# Patient Record
Sex: FEMALE | Race: WHITE | Employment: FULL TIME | URBAN - METROPOLITAN AREA
[De-identification: names, ages, dates, MRNs, and addresses within clinical notes are randomized per-mention and may not be internally consistent; named-entity substitution may affect disease eponyms.]

---

## 2019-08-30 ENCOUNTER — LAB ENCOUNTER (OUTPATIENT)
Dept: LAB | Facility: HOSPITAL | Age: 64
End: 2019-08-30
Attending: INTERNAL MEDICINE
Payer: COMMERCIAL

## 2019-08-30 DIAGNOSIS — Z01.818 PREOP TESTING: Primary | ICD-10-CM

## 2019-08-30 LAB
APTT PPP: 35 SECONDS (ref 23.2–35.3)
INR BLD: 1.09 (ref 0.9–1.2)
PROTHROMBIN TIME: 13.9 SECONDS (ref 11.8–14.5)

## 2019-08-30 PROCEDURE — 85610 PROTHROMBIN TIME: CPT

## 2019-08-30 PROCEDURE — 36415 COLL VENOUS BLD VENIPUNCTURE: CPT

## 2019-08-30 PROCEDURE — 85730 THROMBOPLASTIN TIME PARTIAL: CPT

## 2019-09-04 RX ORDER — ATORVASTATIN CALCIUM 20 MG/1
20 TABLET, FILM COATED ORAL DAILY
COMMUNITY

## 2019-09-06 NOTE — H&P
[address] [facility] Patient Status:  Surgery Admit Cloretta Carrel    1955 MRN [id number]   Location [place] [address] Attending Lisbet Daniel, *   Hosp Day # 0 PCP No primary care prov use: No    Drug use: Never    Allergies/Medications: Allergies:   Seasonal                    Comment:NASAL CONGESTION  Vantin [Cefpodoxime]    RASH    Comment:Reaction many years ago    No medications prior to admission.     Review of Systems:   Jacinta Melchor

## 2019-09-10 ENCOUNTER — HOSPITAL ENCOUNTER (INPATIENT)
Facility: HOSPITAL | Age: 64
LOS: 1 days | Discharge: HOME HEALTH | DRG: 470 | End: 2019-09-11
Attending: ORTHOPAEDIC SURGERY | Admitting: ORTHOPAEDIC SURGERY
Payer: COMMERCIAL

## 2019-09-10 ENCOUNTER — ANESTHESIA EVENT (OUTPATIENT)
Dept: SURGERY | Facility: HOSPITAL | Age: 64
DRG: 470 | End: 2019-09-10
Payer: COMMERCIAL

## 2019-09-10 ENCOUNTER — ANESTHESIA (OUTPATIENT)
Dept: SURGERY | Facility: HOSPITAL | Age: 64
DRG: 470 | End: 2019-09-10
Payer: COMMERCIAL

## 2019-09-10 ENCOUNTER — APPOINTMENT (OUTPATIENT)
Dept: GENERAL RADIOLOGY | Facility: HOSPITAL | Age: 64
DRG: 470 | End: 2019-09-10
Attending: PHYSICIAN ASSISTANT
Payer: COMMERCIAL

## 2019-09-10 DIAGNOSIS — M17.11 PRIMARY OSTEOARTHRITIS OF RIGHT KNEE: ICD-10-CM

## 2019-09-10 DIAGNOSIS — Z01.818 PRE-OP TESTING: Primary | ICD-10-CM

## 2019-09-10 PROBLEM — J45.909 ASTHMA (HCC): Chronic | Status: ACTIVE | Noted: 2019-09-10

## 2019-09-10 PROBLEM — J45.909 ASTHMA: Chronic | Status: ACTIVE | Noted: 2019-09-10

## 2019-09-10 LAB
ANTIBODY SCREEN: NEGATIVE
DEPRECATED RDW RBC AUTO: 44.7 FL (ref 35.1–46.3)
ERYTHROCYTE [DISTWIDTH] IN BLOOD BY AUTOMATED COUNT: 12.9 % (ref 11–15)
GLUCOSE BLDC GLUCOMTR-MCNC: 153 MG/DL (ref 70–99)
GLUCOSE BLDC GLUCOMTR-MCNC: 179 MG/DL (ref 70–99)
GLUCOSE BLDC GLUCOMTR-MCNC: 188 MG/DL (ref 70–99)
GLUCOSE BLDC GLUCOMTR-MCNC: 189 MG/DL (ref 70–99)
GLUCOSE BLDC GLUCOMTR-MCNC: 206 MG/DL (ref 70–99)
HCT VFR BLD AUTO: 38.2 % (ref 35–48)
HGB BLD-MCNC: 12.3 G/DL (ref 12–16)
MCH RBC QN AUTO: 30.4 PG (ref 26–34)
MCHC RBC AUTO-ENTMCNC: 32.2 G/DL (ref 31–37)
MCV RBC AUTO: 94.3 FL (ref 80–100)
PLATELET # BLD AUTO: 228 10(3)UL (ref 150–450)
RBC # BLD AUTO: 4.05 X10(6)UL (ref 3.8–5.3)
RH BLOOD TYPE: POSITIVE
WBC # BLD AUTO: 10.7 X10(3) UL (ref 4–11)

## 2019-09-10 PROCEDURE — 99232 SBSQ HOSP IP/OBS MODERATE 35: CPT | Performed by: HOSPITALIST

## 2019-09-10 PROCEDURE — S0077 INJECTION, CLINDAMYCIN PHOSP: HCPCS | Performed by: NURSE ANESTHETIST, CERTIFIED REGISTERED

## 2019-09-10 PROCEDURE — 0SRC0J9 REPLACEMENT OF RIGHT KNEE JOINT WITH SYNTHETIC SUBSTITUTE, CEMENTED, OPEN APPROACH: ICD-10-PCS | Performed by: ORTHOPAEDIC SURGERY

## 2019-09-10 PROCEDURE — 73560 X-RAY EXAM OF KNEE 1 OR 2: CPT | Performed by: PHYSICIAN ASSISTANT

## 2019-09-10 DEVICE — REFOBACIN BC R 1X40 US: Type: IMPLANTABLE DEVICE | Site: KNEE | Status: FUNCTIONAL

## 2019-09-10 RX ORDER — ALBUTEROL SULFATE 90 UG/1
2 AEROSOL, METERED RESPIRATORY (INHALATION) DAILY PRN
Status: DISCONTINUED | OUTPATIENT
Start: 2019-09-10 | End: 2019-09-11

## 2019-09-10 RX ORDER — FAMOTIDINE 20 MG/1
20 TABLET ORAL 2 TIMES DAILY
Status: DISCONTINUED | OUTPATIENT
Start: 2019-09-10 | End: 2019-09-10

## 2019-09-10 RX ORDER — SODIUM CHLORIDE 0.9 % (FLUSH) 0.9 %
10 SYRINGE (ML) INJECTION AS NEEDED
Status: DISCONTINUED | OUTPATIENT
Start: 2019-09-10 | End: 2019-09-11

## 2019-09-10 RX ORDER — MIDAZOLAM HYDROCHLORIDE 1 MG/ML
INJECTION INTRAMUSCULAR; INTRAVENOUS AS NEEDED
Status: DISCONTINUED | OUTPATIENT
Start: 2019-09-10 | End: 2019-09-10 | Stop reason: SURG

## 2019-09-10 RX ORDER — NALOXONE HYDROCHLORIDE 0.4 MG/ML
80 INJECTION, SOLUTION INTRAMUSCULAR; INTRAVENOUS; SUBCUTANEOUS AS NEEDED
Status: DISCONTINUED | OUTPATIENT
Start: 2019-09-10 | End: 2019-09-10 | Stop reason: HOSPADM

## 2019-09-10 RX ORDER — ONDANSETRON 2 MG/ML
INJECTION INTRAMUSCULAR; INTRAVENOUS AS NEEDED
Status: DISCONTINUED | OUTPATIENT
Start: 2019-09-10 | End: 2019-09-10 | Stop reason: SURG

## 2019-09-10 RX ORDER — MELATONIN
325
Status: DISCONTINUED | OUTPATIENT
Start: 2019-09-10 | End: 2019-09-11

## 2019-09-10 RX ORDER — HYDROMORPHONE HYDROCHLORIDE 1 MG/ML
0.4 INJECTION, SOLUTION INTRAMUSCULAR; INTRAVENOUS; SUBCUTANEOUS EVERY 5 MIN PRN
Status: DISCONTINUED | OUTPATIENT
Start: 2019-09-10 | End: 2019-09-10 | Stop reason: HOSPADM

## 2019-09-10 RX ORDER — HALOPERIDOL 5 MG/ML
0.5 INJECTION INTRAMUSCULAR ONCE AS NEEDED
Status: ACTIVE | OUTPATIENT
Start: 2019-09-10 | End: 2019-09-10

## 2019-09-10 RX ORDER — SENNOSIDES 8.6 MG
17.2 TABLET ORAL NIGHTLY
Status: DISCONTINUED | OUTPATIENT
Start: 2019-09-10 | End: 2019-09-11

## 2019-09-10 RX ORDER — ATORVASTATIN CALCIUM 20 MG/1
20 TABLET, FILM COATED ORAL DAILY
Status: DISCONTINUED | OUTPATIENT
Start: 2019-09-11 | End: 2019-09-11

## 2019-09-10 RX ORDER — ONDANSETRON 2 MG/ML
4 INJECTION INTRAMUSCULAR; INTRAVENOUS ONCE AS NEEDED
Status: DISCONTINUED | OUTPATIENT
Start: 2019-09-10 | End: 2019-09-10

## 2019-09-10 RX ORDER — BUPIVACAINE HYDROCHLORIDE AND EPINEPHRINE 5; 5 MG/ML; UG/ML
INJECTION, SOLUTION PERINEURAL AS NEEDED
Status: DISCONTINUED | OUTPATIENT
Start: 2019-09-10 | End: 2019-09-10 | Stop reason: HOSPADM

## 2019-09-10 RX ORDER — ASPIRIN 325 MG
325 TABLET ORAL 2 TIMES DAILY
Status: DISCONTINUED | OUTPATIENT
Start: 2019-09-10 | End: 2019-09-11

## 2019-09-10 RX ORDER — HYDROCODONE BITARTRATE AND ACETAMINOPHEN 7.5; 325 MG/1; MG/1
1 TABLET ORAL EVERY 6 HOURS PRN
Status: ACTIVE | OUTPATIENT
Start: 2019-09-10 | End: 2019-09-11

## 2019-09-10 RX ORDER — DEXTROSE, SODIUM CHLORIDE, AND POTASSIUM CHLORIDE 5; .45; .15 G/100ML; G/100ML; G/100ML
INJECTION INTRAVENOUS CONTINUOUS
Status: DISCONTINUED | OUTPATIENT
Start: 2019-09-10 | End: 2019-09-11

## 2019-09-10 RX ORDER — POLYETHYLENE GLYCOL 3350 17 G/17G
17 POWDER, FOR SOLUTION ORAL DAILY PRN
Status: DISCONTINUED | OUTPATIENT
Start: 2019-09-10 | End: 2019-09-11

## 2019-09-10 RX ORDER — DEXAMETHASONE SODIUM PHOSPHATE 4 MG/ML
VIAL (ML) INJECTION AS NEEDED
Status: DISCONTINUED | OUTPATIENT
Start: 2019-09-10 | End: 2019-09-10 | Stop reason: SURG

## 2019-09-10 RX ORDER — ACETAMINOPHEN 325 MG/1
650 TABLET ORAL EVERY 6 HOURS PRN
Status: ACTIVE | OUTPATIENT
Start: 2019-09-10 | End: 2019-09-11

## 2019-09-10 RX ORDER — HYDROCODONE BITARTRATE AND ACETAMINOPHEN 5; 325 MG/1; MG/1
1 TABLET ORAL AS NEEDED
Status: DISCONTINUED | OUTPATIENT
Start: 2019-09-10 | End: 2019-09-10 | Stop reason: HOSPADM

## 2019-09-10 RX ORDER — ACETAMINOPHEN 500 MG
1000 TABLET ORAL ONCE
Status: DISCONTINUED | OUTPATIENT
Start: 2019-09-10 | End: 2019-09-10 | Stop reason: HOSPADM

## 2019-09-10 RX ORDER — SODIUM PHOSPHATE, DIBASIC AND SODIUM PHOSPHATE, MONOBASIC 7; 19 G/133ML; G/133ML
1 ENEMA RECTAL ONCE AS NEEDED
Status: DISCONTINUED | OUTPATIENT
Start: 2019-09-10 | End: 2019-09-11

## 2019-09-10 RX ORDER — BUPIVACAINE HYDROCHLORIDE 7.5 MG/ML
INJECTION, SOLUTION INTRASPINAL AS NEEDED
Status: DISCONTINUED | OUTPATIENT
Start: 2019-09-10 | End: 2019-09-10 | Stop reason: SURG

## 2019-09-10 RX ORDER — HYDROMORPHONE HYDROCHLORIDE 1 MG/ML
0.2 INJECTION, SOLUTION INTRAMUSCULAR; INTRAVENOUS; SUBCUTANEOUS EVERY 5 MIN PRN
Status: DISCONTINUED | OUTPATIENT
Start: 2019-09-10 | End: 2019-09-10 | Stop reason: HOSPADM

## 2019-09-10 RX ORDER — LIDOCAINE HYDROCHLORIDE 10 MG/ML
INJECTION, SOLUTION EPIDURAL; INFILTRATION; INTRACAUDAL; PERINEURAL AS NEEDED
Status: DISCONTINUED | OUTPATIENT
Start: 2019-09-10 | End: 2019-09-10 | Stop reason: SURG

## 2019-09-10 RX ORDER — DIPHENHYDRAMINE HCL 25 MG
25 CAPSULE ORAL EVERY 4 HOURS PRN
Status: DISPENSED | OUTPATIENT
Start: 2019-09-10 | End: 2019-09-11

## 2019-09-10 RX ORDER — FAMOTIDINE 20 MG/1
20 TABLET ORAL ONCE
Status: COMPLETED | OUTPATIENT
Start: 2019-09-10 | End: 2019-09-10

## 2019-09-10 RX ORDER — BISACODYL 10 MG
10 SUPPOSITORY, RECTAL RECTAL
Status: DISCONTINUED | OUTPATIENT
Start: 2019-09-10 | End: 2019-09-11

## 2019-09-10 RX ORDER — CELECOXIB 200 MG/1
400 CAPSULE ORAL ONCE
Status: COMPLETED | OUTPATIENT
Start: 2019-09-10 | End: 2019-09-10

## 2019-09-10 RX ORDER — MORPHINE SULFATE 2 MG/ML
1 INJECTION, SOLUTION INTRAMUSCULAR; INTRAVENOUS EVERY 2 HOUR PRN
Status: DISCONTINUED | OUTPATIENT
Start: 2019-09-10 | End: 2019-09-11

## 2019-09-10 RX ORDER — NALBUPHINE HCL 10 MG/ML
2.5 AMPUL (ML) INJECTION EVERY 4 HOURS PRN
Status: ACTIVE | OUTPATIENT
Start: 2019-09-10 | End: 2019-09-11

## 2019-09-10 RX ORDER — HYDROCODONE BITARTRATE AND ACETAMINOPHEN 5; 325 MG/1; MG/1
2 TABLET ORAL AS NEEDED
Status: DISCONTINUED | OUTPATIENT
Start: 2019-09-10 | End: 2019-09-10 | Stop reason: HOSPADM

## 2019-09-10 RX ORDER — DIPHENHYDRAMINE HYDROCHLORIDE 50 MG/ML
12.5 INJECTION INTRAMUSCULAR; INTRAVENOUS EVERY 4 HOURS PRN
Status: DISCONTINUED | OUTPATIENT
Start: 2019-09-10 | End: 2019-09-11

## 2019-09-10 RX ORDER — MORPHINE SULFATE 1 MG/ML
INJECTION, SOLUTION EPIDURAL; INTRATHECAL; INTRAVENOUS AS NEEDED
Status: DISCONTINUED | OUTPATIENT
Start: 2019-09-10 | End: 2019-09-10 | Stop reason: SURG

## 2019-09-10 RX ORDER — METOCLOPRAMIDE HYDROCHLORIDE 5 MG/ML
10 INJECTION INTRAMUSCULAR; INTRAVENOUS EVERY 6 HOURS PRN
Status: DISCONTINUED | OUTPATIENT
Start: 2019-09-10 | End: 2019-09-11

## 2019-09-10 RX ORDER — MORPHINE SULFATE 4 MG/ML
4 INJECTION, SOLUTION INTRAMUSCULAR; INTRAVENOUS EVERY 2 HOUR PRN
Status: DISCONTINUED | OUTPATIENT
Start: 2019-09-10 | End: 2019-09-11

## 2019-09-10 RX ORDER — HYDROMORPHONE HYDROCHLORIDE 1 MG/ML
0.4 INJECTION, SOLUTION INTRAMUSCULAR; INTRAVENOUS; SUBCUTANEOUS
Status: ACTIVE | OUTPATIENT
Start: 2019-09-10 | End: 2019-09-11

## 2019-09-10 RX ORDER — FAMOTIDINE 10 MG/ML
20 INJECTION, SOLUTION INTRAVENOUS 2 TIMES DAILY
Status: DISCONTINUED | OUTPATIENT
Start: 2019-09-10 | End: 2019-09-10

## 2019-09-10 RX ORDER — DEXTROSE MONOHYDRATE 25 G/50ML
50 INJECTION, SOLUTION INTRAVENOUS
Status: DISCONTINUED | OUTPATIENT
Start: 2019-09-10 | End: 2019-09-10 | Stop reason: HOSPADM

## 2019-09-10 RX ORDER — DIPHENHYDRAMINE HCL 25 MG
25 CAPSULE ORAL 2 TIMES DAILY PRN
Status: DISCONTINUED | OUTPATIENT
Start: 2019-09-10 | End: 2019-09-11

## 2019-09-10 RX ORDER — ONDANSETRON 2 MG/ML
4 INJECTION INTRAMUSCULAR; INTRAVENOUS EVERY 4 HOURS PRN
Status: DISCONTINUED | OUTPATIENT
Start: 2019-09-10 | End: 2019-09-11

## 2019-09-10 RX ORDER — DIPHENHYDRAMINE HYDROCHLORIDE 50 MG/ML
25 INJECTION INTRAMUSCULAR; INTRAVENOUS ONCE AS NEEDED
Status: ACTIVE | OUTPATIENT
Start: 2019-09-10 | End: 2019-09-10

## 2019-09-10 RX ORDER — ACETAMINOPHEN 500 MG
1000 TABLET ORAL ONCE
Status: COMPLETED | OUTPATIENT
Start: 2019-09-10 | End: 2019-09-10

## 2019-09-10 RX ORDER — PROCHLORPERAZINE EDISYLATE 5 MG/ML
5 INJECTION INTRAMUSCULAR; INTRAVENOUS ONCE AS NEEDED
Status: DISCONTINUED | OUTPATIENT
Start: 2019-09-10 | End: 2019-09-10 | Stop reason: HOSPADM

## 2019-09-10 RX ORDER — PROCHLORPERAZINE EDISYLATE 5 MG/ML
10 INJECTION INTRAMUSCULAR; INTRAVENOUS EVERY 6 HOURS PRN
Status: DISCONTINUED | OUTPATIENT
Start: 2019-09-10 | End: 2019-09-11

## 2019-09-10 RX ORDER — GABAPENTIN 600 MG/1
600 TABLET ORAL ONCE
Status: COMPLETED | OUTPATIENT
Start: 2019-09-10 | End: 2019-09-10

## 2019-09-10 RX ORDER — GABAPENTIN 300 MG/1
300 CAPSULE ORAL NIGHTLY
Status: DISCONTINUED | OUTPATIENT
Start: 2019-09-10 | End: 2019-09-11

## 2019-09-10 RX ORDER — ACETAMINOPHEN 500 MG
1000 TABLET ORAL EVERY 6 HOURS PRN
COMMUNITY

## 2019-09-10 RX ORDER — ONDANSETRON 2 MG/ML
4 INJECTION INTRAMUSCULAR; INTRAVENOUS ONCE AS NEEDED
Status: DISCONTINUED | OUTPATIENT
Start: 2019-09-10 | End: 2019-09-10 | Stop reason: HOSPADM

## 2019-09-10 RX ORDER — DIPHENHYDRAMINE HCL 25 MG
25 CAPSULE ORAL EVERY 4 HOURS PRN
Status: DISCONTINUED | OUTPATIENT
Start: 2019-09-10 | End: 2019-09-11

## 2019-09-10 RX ORDER — CLINDAMYCIN PHOSPHATE 150 MG/ML
INJECTION, SOLUTION INTRAVENOUS AS NEEDED
Status: DISCONTINUED | OUTPATIENT
Start: 2019-09-10 | End: 2019-09-10 | Stop reason: SURG

## 2019-09-10 RX ORDER — CEFAZOLIN SODIUM/WATER 2 G/20 ML
2 SYRINGE (ML) INTRAVENOUS ONCE
Status: DISCONTINUED | OUTPATIENT
Start: 2019-09-10 | End: 2019-09-10 | Stop reason: ALTCHOICE

## 2019-09-10 RX ORDER — DIPHENHYDRAMINE HYDROCHLORIDE 50 MG/ML
12.5 INJECTION INTRAMUSCULAR; INTRAVENOUS EVERY 4 HOURS PRN
Status: ACTIVE | OUTPATIENT
Start: 2019-09-10 | End: 2019-09-11

## 2019-09-10 RX ORDER — SERTRALINE HYDROCHLORIDE 100 MG/1
100 TABLET, FILM COATED ORAL DAILY
Status: DISCONTINUED | OUTPATIENT
Start: 2019-09-11 | End: 2019-09-11

## 2019-09-10 RX ORDER — CELECOXIB 200 MG/1
200 CAPSULE ORAL EVERY 12 HOURS SCHEDULED
Status: DISCONTINUED | OUTPATIENT
Start: 2019-09-10 | End: 2019-09-11

## 2019-09-10 RX ORDER — LORAZEPAM 1 MG/1
1 TABLET ORAL 2 TIMES DAILY PRN
Status: DISCONTINUED | OUTPATIENT
Start: 2019-09-10 | End: 2019-09-11

## 2019-09-10 RX ORDER — MORPHINE SULFATE 2 MG/ML
2 INJECTION, SOLUTION INTRAMUSCULAR; INTRAVENOUS EVERY 2 HOUR PRN
Status: DISCONTINUED | OUTPATIENT
Start: 2019-09-10 | End: 2019-09-11

## 2019-09-10 RX ORDER — SODIUM CHLORIDE, SODIUM LACTATE, POTASSIUM CHLORIDE, CALCIUM CHLORIDE 600; 310; 30; 20 MG/100ML; MG/100ML; MG/100ML; MG/100ML
INJECTION, SOLUTION INTRAVENOUS CONTINUOUS
Status: DISCONTINUED | OUTPATIENT
Start: 2019-09-10 | End: 2019-09-10 | Stop reason: HOSPADM

## 2019-09-10 RX ORDER — DOCUSATE SODIUM 100 MG/1
100 CAPSULE, LIQUID FILLED ORAL 2 TIMES DAILY
Status: DISCONTINUED | OUTPATIENT
Start: 2019-09-10 | End: 2019-09-11

## 2019-09-10 RX ORDER — HYDROCODONE BITARTRATE AND ACETAMINOPHEN 7.5; 325 MG/1; MG/1
2 TABLET ORAL EVERY 6 HOURS PRN
Status: ACTIVE | OUTPATIENT
Start: 2019-09-10 | End: 2019-09-11

## 2019-09-10 RX ORDER — MORPHINE SULFATE 10 MG/ML
6 INJECTION, SOLUTION INTRAMUSCULAR; INTRAVENOUS EVERY 10 MIN PRN
Status: DISCONTINUED | OUTPATIENT
Start: 2019-09-10 | End: 2019-09-10 | Stop reason: HOSPADM

## 2019-09-10 RX ORDER — IBUPROFEN 600 MG/1
600 TABLET ORAL EVERY 6 HOURS PRN
Status: DISCONTINUED | OUTPATIENT
Start: 2019-09-10 | End: 2019-09-11

## 2019-09-10 RX ORDER — MORPHINE SULFATE 4 MG/ML
2 INJECTION, SOLUTION INTRAMUSCULAR; INTRAVENOUS EVERY 10 MIN PRN
Status: DISCONTINUED | OUTPATIENT
Start: 2019-09-10 | End: 2019-09-10 | Stop reason: HOSPADM

## 2019-09-10 RX ORDER — NALOXONE HYDROCHLORIDE 0.4 MG/ML
0.08 INJECTION, SOLUTION INTRAMUSCULAR; INTRAVENOUS; SUBCUTANEOUS
Status: ACTIVE | OUTPATIENT
Start: 2019-09-10 | End: 2019-09-11

## 2019-09-10 RX ORDER — ACETAMINOPHEN 325 MG/1
650 TABLET ORAL EVERY 4 HOURS PRN
Status: DISCONTINUED | OUTPATIENT
Start: 2019-09-10 | End: 2019-09-11

## 2019-09-10 RX ORDER — CYCLOBENZAPRINE HCL 10 MG
10 TABLET ORAL 3 TIMES DAILY PRN
Status: DISCONTINUED | OUTPATIENT
Start: 2019-09-10 | End: 2019-09-11

## 2019-09-10 RX ORDER — HALOPERIDOL 5 MG/ML
0.25 INJECTION INTRAMUSCULAR ONCE AS NEEDED
Status: DISCONTINUED | OUTPATIENT
Start: 2019-09-10 | End: 2019-09-10

## 2019-09-10 RX ORDER — FAMOTIDINE 20 MG/1
20 TABLET ORAL DAILY
Status: DISCONTINUED | OUTPATIENT
Start: 2019-09-11 | End: 2019-09-11

## 2019-09-10 RX ORDER — DEXTROSE MONOHYDRATE 25 G/50ML
50 INJECTION, SOLUTION INTRAVENOUS AS NEEDED
Status: DISCONTINUED | OUTPATIENT
Start: 2019-09-10 | End: 2019-09-11

## 2019-09-10 RX ORDER — METOCLOPRAMIDE 10 MG/1
10 TABLET ORAL ONCE
Status: COMPLETED | OUTPATIENT
Start: 2019-09-10 | End: 2019-09-10

## 2019-09-10 RX ORDER — HYDROCODONE BITARTRATE AND ACETAMINOPHEN 7.5; 325 MG/1; MG/1
1 TABLET ORAL EVERY 4 HOURS PRN
Status: DISCONTINUED | OUTPATIENT
Start: 2019-09-10 | End: 2019-09-11

## 2019-09-10 RX ORDER — HYDROMORPHONE HYDROCHLORIDE 1 MG/ML
0.6 INJECTION, SOLUTION INTRAMUSCULAR; INTRAVENOUS; SUBCUTANEOUS
Status: ACTIVE | OUTPATIENT
Start: 2019-09-10 | End: 2019-09-11

## 2019-09-10 RX ORDER — PROCHLORPERAZINE EDISYLATE 5 MG/ML
5 INJECTION INTRAMUSCULAR; INTRAVENOUS ONCE AS NEEDED
Status: DISCONTINUED | OUTPATIENT
Start: 2019-09-10 | End: 2019-09-10

## 2019-09-10 RX ORDER — SODIUM CHLORIDE, SODIUM LACTATE, POTASSIUM CHLORIDE, CALCIUM CHLORIDE 600; 310; 30; 20 MG/100ML; MG/100ML; MG/100ML; MG/100ML
INJECTION, SOLUTION INTRAVENOUS CONTINUOUS
Status: DISCONTINUED | OUTPATIENT
Start: 2019-09-10 | End: 2019-09-11

## 2019-09-10 RX ORDER — HYDROMORPHONE HYDROCHLORIDE 1 MG/ML
0.6 INJECTION, SOLUTION INTRAMUSCULAR; INTRAVENOUS; SUBCUTANEOUS EVERY 5 MIN PRN
Status: DISCONTINUED | OUTPATIENT
Start: 2019-09-10 | End: 2019-09-10 | Stop reason: HOSPADM

## 2019-09-10 RX ORDER — HYDROCODONE BITARTRATE AND ACETAMINOPHEN 7.5; 325 MG/1; MG/1
2 TABLET ORAL EVERY 4 HOURS PRN
Status: DISCONTINUED | OUTPATIENT
Start: 2019-09-10 | End: 2019-09-11

## 2019-09-10 RX ORDER — METOPROLOL TARTRATE 5 MG/5ML
2.5 INJECTION INTRAVENOUS ONCE
Status: DISCONTINUED | OUTPATIENT
Start: 2019-09-10 | End: 2019-09-10 | Stop reason: HOSPADM

## 2019-09-10 RX ORDER — MORPHINE SULFATE 4 MG/ML
4 INJECTION, SOLUTION INTRAMUSCULAR; INTRAVENOUS EVERY 10 MIN PRN
Status: DISCONTINUED | OUTPATIENT
Start: 2019-09-10 | End: 2019-09-10 | Stop reason: HOSPADM

## 2019-09-10 RX ADMIN — DEXAMETHASONE SODIUM PHOSPHATE 4 MG: 4 MG/ML VIAL (ML) INJECTION at 07:41:00

## 2019-09-10 RX ADMIN — LIDOCAINE HYDROCHLORIDE 5 MG: 10 INJECTION, SOLUTION EPIDURAL; INFILTRATION; INTRACAUDAL; PERINEURAL at 07:46:00

## 2019-09-10 RX ADMIN — MORPHINE SULFATE 0.25 MG: 1 INJECTION, SOLUTION EPIDURAL; INTRATHECAL; INTRAVENOUS at 07:47:00

## 2019-09-10 RX ADMIN — CLINDAMYCIN PHOSPHATE 600 MG: 150 INJECTION, SOLUTION INTRAVENOUS at 07:53:00

## 2019-09-10 RX ADMIN — MIDAZOLAM HYDROCHLORIDE 2 MG: 1 INJECTION INTRAMUSCULAR; INTRAVENOUS at 07:41:00

## 2019-09-10 RX ADMIN — ONDANSETRON 4 MG: 2 INJECTION INTRAMUSCULAR; INTRAVENOUS at 07:41:00

## 2019-09-10 RX ADMIN — LIDOCAINE HYDROCHLORIDE 5 MG: 10 INJECTION, SOLUTION EPIDURAL; INFILTRATION; INTRACAUDAL; PERINEURAL at 07:44:00

## 2019-09-10 RX ADMIN — SODIUM CHLORIDE, SODIUM LACTATE, POTASSIUM CHLORIDE, CALCIUM CHLORIDE: 600; 310; 30; 20 INJECTION, SOLUTION INTRAVENOUS at 09:29:00

## 2019-09-10 RX ADMIN — LIDOCAINE HYDROCHLORIDE 25 MG: 10 INJECTION, SOLUTION EPIDURAL; INFILTRATION; INTRACAUDAL; PERINEURAL at 07:49:00

## 2019-09-10 RX ADMIN — MIDAZOLAM HYDROCHLORIDE 2 MG: 1 INJECTION INTRAMUSCULAR; INTRAVENOUS at 07:45:00

## 2019-09-10 RX ADMIN — BUPIVACAINE HYDROCHLORIDE 1.5 ML: 7.5 INJECTION, SOLUTION INTRASPINAL at 07:47:00

## 2019-09-10 NOTE — ANESTHESIA PREPROCEDURE EVALUATION
Anesthesia PreOp Note    HPI:     Jayda Alba is a 66year old female who presents for preoperative consultation requested by: Kely Will MD    Date of Surgery: 9/10/2019    Procedure(s):  KNEE TOTAL REPLACEMENT  Indication: osteoarthritis Oral Cap Take by mouth. Disp:  Rfl:  9/9/2019 at 2200   atorvastatin 20 MG Oral Tab Take 20 mg by mouth daily. Disp:  Rfl:  9/10/2019 at 400   Cholecalciferol (VITAMIN D) 1000 UNITS Oral Tab Take 5,000 mg by mouth daily.    Disp:  Rfl:  9/9/2019 at 2000   R Once Anais Roman MD     Midazolam HCl (VERSED) 2 MG/2ML injection  Intravenous PRN Maxi GARCIA CRNA 2 mg at 09/10/19 0745    lidocaine HCl (PF) (XYLOCAINE) 1 % injection SOLN  Injection PRN Yulissa Salvador CRNA 5 mg at 09/ tablet Oral PRN Santi Leavens, DO     fentaNYL citrate (SUBLIMAZE) 0.05 MG/ML injection 25 mcg 25 mcg Intravenous Q5 Min PRN Santi Leavens, DO     fentaNYL citrate (SUBLIMAZE) 0.05 MG/ML injection 50 mcg 50 mcg Intravenous Q5 Min PRN Santi Leavens, DO (NORCO) 7.5-325 MG per tab 1 tablet 1 tablet Oral Q6H PRN Pearle Cater, DO     HYDROcodone-acetaminophen (NORCO) 7.5-325 MG per tab 2 tablet 2 tablet Oral Q6H PRN Pearle Cater, DO     HYDROmorphone HCl (DILAUDID) 1 MG/ML injection 0.4 mg 0.4 mg Intraveno file        Attends Scientologist service: Not on file        Active member of club or organization: Not on file        Attends meetings of clubs or organizations: Not on file        Relationship status: Not on file      Intimate partner violence:        Fear guardian or family member of the nature of the anesthetic plan, benefits, risks including possible dental damage if relevant, major complications, and any alternative forms of anesthetic management.    All of the patient's questions were answered to the bes

## 2019-09-10 NOTE — RESPIRATORY THERAPY NOTE
LAURA ASSESSMENT:    Pt does have a previous diagnosis of LAURA. Pt does routinely use a CPAP device at home. This pt brought their own cpap from home.

## 2019-09-10 NOTE — OPERATIVE REPORT
[facility]    PATIENT'S NAME: Delores Barbara   ATTENDING PHYSICIAN: Kendra Alejandra MD   OPERATING PHYSICIAN: Kendra Alejandra MD   PATIENT ACCOUNT#:   [id number]    LOCATION:  [facility] [address] PACU [facility] 10  MEDICAL RECORD #:   [id number] tubercle, and a medial arthrotomy was performed. There was extensive medial compartment arthritis with bone-on-bone exposure on the tibial and femoral surfaces. There was some chondromalacia of the patella itself with no exposed bone.   This was a central [address] [facility] [id number]  [facility]/

## 2019-09-10 NOTE — ANESTHESIA PROCEDURE NOTES
Spinal Block  Performed by: Frieda Mireles CRNA  Authorized by: Gris Das DO     Patient Location:  OR  Start Time:  9/10/2019 7:42 AM  End Time:  9/10/2019 7:47 AM  Site identification: surface landmarks    Reason for Block: at surgeon's re

## 2019-09-10 NOTE — INTERVAL H&P NOTE
Pre-op Diagnosis: osteoarthritis right knee    The above referenced H&P was reviewed by Devin Judge MD on 9/10/2019, the patient was examined and no significant changes have occurred in the patient's condition since the H&P was performed.   I dis

## 2019-09-10 NOTE — ANESTHESIA POSTPROCEDURE EVALUATION
Patient: Dania Vicente    Procedure Summary     Date:  09/10/19 Room / Location:  [address] [place] [address] MAIN OR 11 / [address] [place] [address] OR    Anesthesia Start:  4804 Anesthesia Stop:      Procedure:  KNEE TOTAL REPLACEMENT (Right Knee) Diagnosis:  (osteoarthritis right knee)    Michel

## 2019-09-10 NOTE — BRIEF OP NOTE
Pre-Operative Diagnosis: osteoarthritis right knee     Post-Operative Diagnosis: osteoarthritis right knee      Procedure Performed:   Procedure(s):  right unicondylar knee replacement    Surgeon(s) and Role:     * Margarito Bird MD - Primary    A

## 2019-09-10 NOTE — PHYSICAL THERAPY NOTE
PHYSICAL THERAPY KNEE EVALUATION - INPATIENT       Room Number: 421/421-A  Evaluation Date: 9/10/2019  Type of Evaluation: Initial  Physician Order: PT Eval and Treat    Presenting Problem: R unicondylar medial joint space arthroplasty  Reason for Therapy: with pain and swelling in the knee. She had a cortisone injection which did not provide any relief. \"     Problem List  Principal Problem:    Osteoarthritis of right knee  Active Problems:    Hypertension    DM type 2 (diabetes mellitus, type 2) ([facility] Cognitive Status:  WFL - within functional limits      BALANCE  Static Sitting: Fair +  Dynamic Sitting: Fair +  Static Standing: Fair -  Dynamic Standing: Fair -                                                                       ADDITIONAL TESTS Stand at assistance level: modified independent     Goal #2  Current Status    Goal #3 Patient is able to ambulate 300 feet with assistive device at assistance level: modified independent    Goal #3   Current Status    Goal #4 Patient will negotiate 3 stai

## 2019-09-10 NOTE — CM/SW NOTE
SW received MDO to discuss discharge planning with pt. Pt states she lives in a house with 1 level and 3 steps inside. Pt lives there with her  and 2 dogs.       SW discussed discharge planning options with pt who states she would like [facility]

## 2019-09-10 NOTE — PROGRESS NOTES
[facility]    Progress Note    Carola Garcia Patient Status:  Inpatient    1955 MRN [id number]   Location [facility] 4W/SW/SE Attending Edgardo Montes MD   Hosp Day # 0 PCP No primary care provider on file.         Chitra Sal 2 (diabetes mellitus, type 2) ([facility]  CONT HOME MEDS, MONITOR ACCU CHECKS. Dyslipidemia  CONT HOME MEDS. LAURA (obstructive sleep apnea)  LAURA PROTOCOL. Asthma  CONT HOME MEDS.              Results:     Lab Results   Component Value Date    WB

## 2019-09-11 VITALS
BODY MASS INDEX: 32.09 KG/M2 | WEIGHT: 174.38 LBS | DIASTOLIC BLOOD PRESSURE: 62 MMHG | HEART RATE: 63 BPM | HEIGHT: 62 IN | RESPIRATION RATE: 18 BRPM | TEMPERATURE: 98 F | SYSTOLIC BLOOD PRESSURE: 115 MMHG | OXYGEN SATURATION: 96 %

## 2019-09-11 LAB
DEPRECATED RDW RBC AUTO: 44.1 FL (ref 35.1–46.3)
ERYTHROCYTE [DISTWIDTH] IN BLOOD BY AUTOMATED COUNT: 12.7 % (ref 11–15)
EST. AVERAGE GLUCOSE BLD GHB EST-MCNC: 160 MG/DL (ref 68–126)
GLUCOSE BLDC GLUCOMTR-MCNC: 113 MG/DL (ref 70–99)
GLUCOSE BLDC GLUCOMTR-MCNC: 164 MG/DL (ref 70–99)
HBA1C MFR BLD HPLC: 7.2 % (ref ?–5.7)
HCT VFR BLD AUTO: 38 % (ref 35–48)
HGB BLD-MCNC: 12.2 G/DL (ref 12–16)
MCH RBC QN AUTO: 30.2 PG (ref 26–34)
MCHC RBC AUTO-ENTMCNC: 32.1 G/DL (ref 31–37)
MCV RBC AUTO: 94.1 FL (ref 80–100)
PLATELET # BLD AUTO: 240 10(3)UL (ref 150–450)
RBC # BLD AUTO: 4.04 X10(6)UL (ref 3.8–5.3)
WBC # BLD AUTO: 12 X10(3) UL (ref 4–11)

## 2019-09-11 PROCEDURE — 99239 HOSP IP/OBS DSCHRG MGMT >30: CPT | Performed by: HOSPITALIST

## 2019-09-11 RX ORDER — GABAPENTIN 300 MG/1
300 CAPSULE ORAL NIGHTLY
Qty: 20 CAPSULE | Refills: 0 | Status: SHIPPED | OUTPATIENT
Start: 2019-09-11

## 2019-09-11 RX ORDER — ASPIRIN 325 MG
325 TABLET ORAL 2 TIMES DAILY
Qty: 60 TABLET | Refills: 0 | Status: SHIPPED | OUTPATIENT
Start: 2019-09-11

## 2019-09-11 RX ORDER — IBUPROFEN 600 MG/1
600 TABLET ORAL EVERY 6 HOURS PRN
Qty: 50 TABLET | Refills: 0 | Status: SHIPPED | OUTPATIENT
Start: 2019-09-11

## 2019-09-11 RX ORDER — CYCLOBENZAPRINE HCL 10 MG
10 TABLET ORAL 3 TIMES DAILY PRN
Qty: 30 TABLET | Refills: 0 | Status: SHIPPED | OUTPATIENT
Start: 2019-09-11

## 2019-09-11 RX ORDER — PSEUDOEPHEDRINE HCL 30 MG
100 TABLET ORAL 2 TIMES DAILY
Qty: 20 CAPSULE | Refills: 0 | Status: SHIPPED | OUTPATIENT
Start: 2019-09-11

## 2019-09-11 RX ORDER — CELECOXIB 200 MG/1
200 CAPSULE ORAL EVERY 12 HOURS SCHEDULED
Qty: 60 CAPSULE | Refills: 0 | Status: SHIPPED | OUTPATIENT
Start: 2019-09-11

## 2019-09-11 NOTE — DISCHARGE SUMMARY
[facility]  Discharge Summary     Vipul Colby  : 1955    Status: Inpatient  Day #: 1    Attending: Kaiarbrown Pelaez MD  PCP: No primary care provider on file.      Date of Admission: 9/10/2019  Date of Discharge: 2019     MECHELLE BURKETT capsule  Refills:  0     Cyclobenzaprine HCl 10 MG Tabs  Commonly known as:  cyclobenzaprine      Take 1 tablet (10 mg total) by mouth 3 (three) times daily as needed for Muscle spasms.    Quantity:  30 tablet  Refills:  0     docusate sodium 100 MG Caps  C GLUCOPHAGE      Take 500 mg by mouth 2 (two) times daily with meals. Refills:  0     Metoprolol Succinate ER 50 MG Tb24  Commonly known as: Toprol XL      Take 75 mg by mouth daily. Refills:  0     multivitamin Tabs      Take 1 tablet by mouth daily.

## 2019-09-11 NOTE — PROGRESS NOTES
[address] [place] [address] Patient Status:  Inpatient    1955 MRN [id number]   Location [facility] 4W/SW/SE Attending Angela Grady MD   Hosp Day # 1 PCP No primary care provider on file.      Subjective:  Post-Operative Day: 1 Status Po

## 2019-09-11 NOTE — PLAN OF CARE
Pain managed with PRN tylenol and ibuprofen Voiding freely. Up 1/walker. Plan [facility] Patient has remained free from falls throughout stay. Hourly rounding maintained. Pt's bed in lowest position w/ side rails up.  Patient has been educat Discharge     Problem: SAFETY ADULT - FALL  Goal: Free from fall injury  Description  INTERVENTIONS:  - Assess pt frequently for physical needs  - Identify cognitive and physical deficits and behaviors that affect risk of falls.   - [place] fall precautio Instruct patient on self management of diabetes  Outcome: Adequate for Discharge

## 2019-09-11 NOTE — OCCUPATIONAL THERAPY NOTE
OCCUPATIONAL THERAPY EVALUATION - INPATIENT     Room Number: 421/421-A  Evaluation Date: 9/11/2019  Type of Evaluation: Initial       Physician Order: IP Consult to Occupational Therapy  Reason for Therapy: ADL/IADL Dysfunction and Discharge Planning Medical History  Past Medical History:   Diagnosis Date   • Anxiety state     metoprolol,5:30   • Arrhythmia     HAD TACHYCARDIA DUE TO ANXIETY ATTACKS, THIS IS REASON FOR METOPROLOL   • Asthma     ALLERGY INDUCED ASTHMA,ALSO PANICK ATTACTS   • Calculus of STRENGTH ASSESSMENT  Upper extremity strength is within functional limits    COORDINATION  Gross Motor: WFL   Fine Motor: WFL     ADDITIONAL TESTS                                    NEUROLOGICAL FINDINGS                   ACTIVITIES OF DAILY LIVING ASS

## 2019-09-11 NOTE — ANESTHESIA POST-OP FOLLOW-UP NOTE
Salty Flowers is POD#1 after   Surgical Procedures     Case IDs Date Procedure Surgeon Location Status    [id number] 9/10/19 KNEE TOTAL REPLACEMENT Zuly Bird MD [facility] OR [facility]      Chacho Salty Flowers underwent spinal anesthesia for analgesia.  Leatha Hernandes

## 2019-09-11 NOTE — PHYSICAL THERAPY NOTE
PHYSICAL THERAPY KNEE TREATMENT NOTE - INPATIENT     Room Number: 421/421-A             Presenting Problem: R unicondylar medial joint space arthroplasty    Problem List  Principal Problem:    Osteoarthritis of right knee  Active Problems:    Hypertension +  Dynamic Standing: Fair +    ACTIVITY TOLERANCE                         O2 WALK                  AM-PAC '6-Clicks' INPATIENT SHORT FORM - BASIC MOBILITY  How much difficulty does the patient currently have. ..  -   Turning over in bed (including adjusting Current Status Negotiated 4 stairs with 1 HR CGA   Goal #5  AROM 0 degrees extension to 95 degrees flexion     Goal #5   Current Status IN PROGRESS   Goal #6 Patient independently performs home exercise program for ROM/strengthening per the instructions

## 2019-09-11 NOTE — PLAN OF CARE
Problem: Patient Centered Care  Goal: Patient preferences are identified and integrated in the patient's plan of care  Description  Interventions:  - What would you like us to know as we care for you?   - Provide timely, complete, and accurate informatio Progressing     Problem: DISCHARGE PLANNING  Goal: Discharge to home or other facility with appropriate resources  Description  INTERVENTIONS:  - Identify barriers to discharge w/pt and caregiver  - Include patient/family/discharge partner in discharge diamond

## 2019-09-11 NOTE — CM/SW NOTE
SW received call RN stating that pt is ready for discharge. MARTY saw [facility] orders were entered and sent order via [employer] to [employer]. Plan: pt will be discharging home with Delfino Lomas through [facility].      SW/CM to remain available for support and

## 2019-12-12 ENCOUNTER — TELEPHONE (OUTPATIENT)
Dept: ORTHOPEDICS CLINIC | Facility: CLINIC | Age: 64
End: 2019-12-12

## 2019-12-12 NOTE — TELEPHONE ENCOUNTER
Dr Ivonne Foy and Jose Nicholson -     Is this an OS patient? Sx 9/10/19, no appts before or since at [address].     Please advise on refill

## 2019-12-12 NOTE — TELEPHONE ENCOUNTER
Current Outpatient Medications   Medication Sig Dispense Refill   • Cyclobenzaprine HCl 10 MG Oral Tab Take 1 tablet (10 mg total) by mouth 3 (three) times daily as needed for Muscle spasms.  30 tablet 0

## 2019-12-12 NOTE — TELEPHONE ENCOUNTER
S/w Jojo at [facility]. She confirmed that pt is an OS pt. I relayed the refill request and Nghia Alvarez' msg re: pt and refill request. She stated she will call the pt and follow up.     Dr Bird/Jozef Nunes

## 2023-08-24 LAB
AMB EXT ANION GAP: 10
AMB EXT BILIRUBIN, TOTAL: 0.4 MG/DL
AMB EXT BUN: 25 MG/DL
AMB EXT CALCIUM: 10.7
AMB EXT CARBON DIOXIDE: 25
AMB EXT CHLORIDE: 105
AMB EXT CHOLESTEROL, TOTAL: 181 MG/DL
AMB EXT CMP ALT: 14 U/L
AMB EXT CMP AST: 17 U/L
AMB EXT CREATININE: 0.84 MG/DL
AMB EXT EGFR NON-AA: 77
AMB EXT GLUCOSE: 129 MG/DL
AMB EXT HDL CHOLESTEROL: 38 MG/DL
AMB EXT HEMATOCRIT: 46.3
AMB EXT HEMOGLOBIN: 14.7
AMB EXT HGBA1C: 6.9 %
AMB EXT MCV: 91.3
AMB EXT PLATELETS: 265
AMB EXT POSTASSIUM: 4.4 MMOL/L
AMB EXT SODIUM: 140 MMOL/L
AMB EXT TOTAL PROTEIN: 7.4
AMB EXT TRIGLYCERIDES: 463 MG/DL
AMB EXT WBC: 8.4 X10(3)UL

## 2023-11-13 ENCOUNTER — TELEPHONE (OUTPATIENT)
Dept: FAMILY MEDICINE CLINIC | Facility: CLINIC | Age: 68
End: 2023-11-13

## 2024-01-19 ENCOUNTER — OFFICE VISIT (OUTPATIENT)
Dept: OBGYN CLINIC | Facility: CLINIC | Age: 69
End: 2024-01-19
Payer: MEDICARE

## 2024-01-19 VITALS
SYSTOLIC BLOOD PRESSURE: 124 MMHG | DIASTOLIC BLOOD PRESSURE: 70 MMHG | HEART RATE: 82 BPM | BODY MASS INDEX: 28.78 KG/M2 | WEIGHT: 156.38 LBS | HEIGHT: 62 IN

## 2024-01-19 DIAGNOSIS — Z01.419 ENCOUNTER FOR WELL WOMAN EXAM WITH ROUTINE GYNECOLOGICAL EXAM: Primary | ICD-10-CM

## 2024-01-19 DIAGNOSIS — L73.2 HIDRADENITIS: ICD-10-CM

## 2024-01-19 PROCEDURE — G0101 CA SCREEN;PELVIC/BREAST EXAM: HCPCS | Performed by: OBSTETRICS & GYNECOLOGY

## 2024-01-19 RX ORDER — MELATONIN
1000 DAILY
COMMUNITY

## 2024-01-19 RX ORDER — FLUTICASONE PROPIONATE AND SALMETEROL 250; 50 UG/1; UG/1
1 POWDER RESPIRATORY (INHALATION) EVERY 12 HOURS
COMMUNITY
Start: 2024-01-02

## 2024-01-19 RX ORDER — CLINDAMYCIN PHOSPHATE 10 MG/G
1 GEL TOPICAL 2 TIMES DAILY
Qty: 30 G | Refills: 0 | Status: SHIPPED | OUTPATIENT
Start: 2024-01-19

## 2024-01-19 NOTE — PROGRESS NOTES
[facility]  Obstetrics and Gynecology    Subjective:     Jewels Reynolds is a 68 year old  who presents for an annual gyn exam. Patient complaints include boils in her groin, has tried changing underwear, not shaving, no difference.    No LMP recorded (lmp unknown). Patient is postmenopausal.   Menarche: 14 (2024 12:51 PM)  Period Cycle (Days): menopause (2024 12:51 PM)  Period Duration (Days): n/a (2024 12:51 PM)  Period Flow: n/a (2024 12:51 PM)  Use of Birth Control (if yes, specify type): Postmenopausal (2024 12:51 PM)  Hx Prior Abnormal Pap: No (2024 12:51 PM)  Pap Result Notes: last pap  wnl per patient (2024 12:51 PM)     Dyspareunia: N/A.    Difficulty with bowel or bladder function: No.   History of STDs: No  Smoker: No.  Safe at home: Yes.  Retired, takes care of 3yo grandson, expecting another grandchild this year!    Screening:  Pap smear: neg   Mammogram: - , scheduled   Colonoscopy: 2014   DEXA: ordered       Review of Systems  Constitutional: Denies fever/chills, weight loss, fatigue, weakness, or sweating  HEENT: Denies headache, hearing loss or tinnitus, ear pain or discharge, nosebleeds, congestion, sore throat  Eye: Denies visual changes, eye pain or discharge or redness  Cardiovascular: Denies chest pain, palpitations  Pulmonary: Denies cough, shortness of breath, wheezing  Breast: denies breast pain or palpable mass, skin changes, nipple discharge  GI: Denies nausea, vomiting, diarrhea, constipation, heartburn, hemachezia  : Denies dysuria, urgency, frequency, hematuria, flank pain  Musculoskeletal: Denies myalgias, pain in neck or back or joints  Skin: Denies rash, itching  Endocrine: Denies easy bruising or bleeding, increased thirst  Neuro: Denies dizziness, paraesthesias, focal weakness, seizures, loss of consciousness  Psych: Denies depression, anxiety, suicidal ideations, hallucinations, insomnia     Past Medical  History   Past Medical History:   Diagnosis Date    Allergic rhinitis     Anxiety     Anxiety state     metoprolol,5:30    Arrhythmia     HAD TACHYCARDIA DUE TO ANXIETY ATTACKS, THIS IS REASON FOR METOPROLOL    Arthritis     Asthma     ALLERGY INDUCED ASTHMA,ALSO PANICK ATTACTS    Calculus of kidney     Coronary atherosclerosis     negative XT '14    Depression     Diabetes (HCC)     Esophageal reflux     indigestion    High blood pressure     High cholesterol     Hyperlipidemia     IBS (irritable bowel syndrome)     Nasal congestion 10/08/2016    STATES DUE TO ENVIRONMENTAL ALLERGIES    Osteoarthritis     Sleep apnea     cpap    Visual impairment     GLASSES AND CONTACTS         Past Obstetric History   OB History    Para Term  AB Living   2 2 2     2   SAB IAB Ectopic Multiple Live Births           2      # Outcome Date GA Lbr Eros/2nd Weight Sex Delivery Anes PTL Lv   2 Term 89 37w0d  6 lb 4 oz (2.835 kg) F NORMAL SPONT   KATHY   1 Term 88 37w0d  5 lb 10 oz (2.551 kg) M NORMAL SPONT   KATHY       Past Surgical History   Past Surgical History:   Procedure Laterality Date    APPENDECTOMY      BENIGN BIOPSY RIGHT      COLONOSCOPY      COLONOSCOPY      KNEE REPLACEMENT SURGERY Right 09/10/2019    PARTIAL           TONSILLECTOMY      TOTAL KNEE REPLACEMENT Left 2017    TOTAL KNEE REPLACEMENT Right 09/10/2019        Family History   Family History   Problem Relation Age of Onset    Other (Other) Father         DEMENTIA    Dementia Father     Depression Father     Heart Disorder Father         Stents    Psychiatric Father         Paranoid    Stroke Father         TIA’S    Cancer Mother         OVARIAN    Heart Disorder Mother         Murmur    Other (Other) Sister         RHEUMATOID        Social History   Social History     Socioeconomic History    Marital status:    Tobacco Use    Smoking status: Never     Passive exposure: Yes    Smokeless tobacco: Never    Tobacco comments:     Less  than 10 per day stupidly started at 18   Vaping Use    Vaping Use: Never used   Substance and Sexual Activity    Alcohol use: No    Drug use: Never    Sexual activity: Not Currently     Partners: Male   Other Topics Concern    Caffeine Concern Yes    Exercise No    Seat Belt Yes    Special Diet No    Stress Concern Yes    Weight Concern No        Medications   Current Outpatient Medications on File Prior to Visit   Medication Sig Dispense Refill    fluticasone-salmeterol 250-50 MCG/ACT Inhalation Aerosol Powder, Breath Activated Inhale 1 puff into the lungs Q12H.      cyanocobalamin 1000 MCG Oral Tab Take 1 tablet (1,000 mcg total) by mouth daily.      JARDIANCE 10 MG Oral Tab Take 1 tablet (10 mg total) by mouth daily.      Multiple Vitamins-Minerals (WOMENS DAILY FORM/FA/CA/FE) Oral Tab Take by mouth.      pyridoxine 100 MG Oral Tab Take 1 tablet (100 mg total) by mouth daily.      B Complex-Folic Acid (SUPER B COMPLEX MAXI OR) Take by mouth.      Omega-3 Fatty Acids (FISH OIL TRIPLE STRENGTH OR) Take by mouth.      sertraline 100 MG Oral Tab Take 2 tablets (200 mg total) by mouth daily. 180 tablet 0    LORazepam 1 MG Oral Tab Take 1 tablet (1 mg total) by mouth 2 (two) times daily. 60 tablet 2    acetaminophen 500 MG Oral Tab Take 2 tablets (1,000 mg total) by mouth every 6 (six) hours as needed for Pain.      atorvastatin 20 MG Oral Tab Take 1 tablet (20 mg total) by mouth daily.      Cholecalciferol (VITAMIN D) 1000 UNITS Oral Tab Take 5,000 mg by mouth daily.        Albuterol Sulfate HFA (PROAIR HFA) 108 (90 BASE) MCG/ACT Inhalation Aero Soln Inhale 2 puffs into the lungs daily as needed for Wheezing.      Fluticasone Propionate  MCG/ACT Inhalation Aerosol Inhale 1 puff into the lungs 2 (two) times daily.      fenofibrate micronized 134 MG Oral Cap Take 155 mg by mouth nightly.        MetFORMIN HCl 500 MG Oral Tab Take 1 tablet (500 mg total) by mouth 2 (two) times daily with meals.      Metoprolol  Succinate ER 50 MG Oral Tablet 24 Hr Take 1.5 tablets (75 mg total) by mouth daily.       No current facility-administered medications on file prior to visit.       Allergies   Allergies   Allergen Reactions    Clarithromycin DIARRHEA and NAUSEA ONLY     Per pt \" diarrhea and abdominal pain.\"    Dust Mite Extract Runny nose    Seasonal      NASAL CONGESTION    Vantin [Cefpodoxime] RASH     Reaction many years ago          Objective:     Vitals:    24 1246   BP: 124/70   Pulse: 82   Weight: 156 lb 6 oz (70.9 kg)   Height: 62\"       Body mass index is 28.6 kg/m².    GEN: AAOx3, NAD, appears well, appears stated age  HEENT: normocephalic, atraumatic, thyroid symmetric without enlargement or nodularity  BREAST: bilaterally symmetric with no palpable masses, no nipple discharge, no skin changes  CV: RRR  PULM: CTAB  ABD: soft, NT, ND, no rebound or guarding  EXT: no c/c/e or tenderness  NEURO: CN 2-12 grossly intact  PELVIC:   Vulva: NEFG. Hidradenitis lesions and scars around groin/mons.   Vagina: Hypoestrogenized, physiologic discharge.    Cervix: No lesions or tenderness.     Uterus: anteverted, 4 week size, firm, mobile, nontender.     Adnexa: No masses or tenderness.     Rectal: Anus and perineum are normal.      Assessment:     Jewels Reynolds is a 68 year old  seen for well-women gyn exam.    Plan:     -- rx topical clindamycin for hidradenitis  -- cervical cancer screening: up to date with pap smear   -- breast cancer screening: continue annual CBE and mammograms  -- STD screening ordered: No  -- Contraception: n/a  -- Discussed further preventative care with PCP, staying up to date with screening and vaccinations, and maintaining healthy diet and exercise.      -- Follow up in 1 year for annual exam or sooner as needed    Savanna Richard MD  [facility]  2024 12:59 PM

## 2024-02-01 ENCOUNTER — HOSPITAL ENCOUNTER (OUTPATIENT)
Dept: MAMMOGRAPHY | Age: 69
Discharge: HOME | End: 2024-02-01
Attending: FAMILY MEDICINE
Payer: MEDICARE

## 2024-02-01 DIAGNOSIS — Z12.31 BREAST CANCER SCREENING BY MAMMOGRAM: ICD-10-CM

## 2024-02-01 PROCEDURE — 77067 SCR MAMMO BI INCL CAD: CPT | Performed by: FAMILY MEDICINE

## 2024-02-01 PROCEDURE — 77063 BREAST TOMOSYNTHESIS BI: CPT | Performed by: FAMILY MEDICINE

## 2024-03-23 DIAGNOSIS — L73.2 HIDRADENITIS: ICD-10-CM

## 2024-03-25 RX ORDER — CLINDAMYCIN PHOSPHATE 10 MG/G
1 GEL TOPICAL 2 TIMES DAILY
Qty: 30 G | Refills: 0 | OUTPATIENT
Start: 2024-03-25

## 2024-03-25 NOTE — TELEPHONE ENCOUNTER
Last OV: 1/19/24 KARIN Quintana  Last Refill Date: 1/19/24 30 grams 0 refills  Follow Up:  1 year 1/2025  Next Appt. None noted on chart.    Dangelo

## 2024-07-11 ENCOUNTER — TELEPHONE (OUTPATIENT)
Dept: FAMILY MEDICINE CLINIC | Facility: CLINIC | Age: 69
End: 2024-07-11

## 2024-07-11 NOTE — TELEPHONE ENCOUNTER
Spoke to the patient, she said she completed her Medicare physical elsewhere, I asked if she changed her PCP and she said, \"not necessarily\" and ended the call.

## 2024-09-22 ENCOUNTER — HOSPITAL ENCOUNTER (EMERGENCY)
Age: 69
Discharge: HOME | End: 2024-09-22
Attending: EMERGENCY MEDICINE
Payer: MEDICARE

## 2024-09-22 VITALS
BODY MASS INDEX: 28.52 KG/M2 | TEMPERATURE: 97 F | WEIGHT: 155 LBS | SYSTOLIC BLOOD PRESSURE: 123 MMHG | RESPIRATION RATE: 18 BRPM | HEIGHT: 62 IN | HEART RATE: 83 BPM | DIASTOLIC BLOOD PRESSURE: 76 MMHG | OXYGEN SATURATION: 96 %

## 2024-09-22 DIAGNOSIS — S61.451A DOG BITE, HAND, RIGHT, INITIAL ENCOUNTER: Primary | ICD-10-CM
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
## 2024-09-22 DIAGNOSIS — S61.411A LACERATION OF RIGHT HAND WITHOUT FOREIGN BODY, INITIAL ENCOUNTER: ICD-10-CM

## 2024-09-22 PROCEDURE — 99283 EMERGENCY DEPT VISIT LOW MDM: CPT

## 2024-09-22 RX ORDER — LORAZEPAM 1 MG/1
1 TABLET ORAL 2 TIMES DAILY
COMMUNITY
Start: 2024-08-16

## 2024-09-22 RX ORDER — SERTRALINE HYDROCHLORIDE 100 MG/1
200 TABLET, FILM COATED ORAL DAILY
COMMUNITY

## 2024-09-22 NOTE — DISCHARGE INSTRUCTIONS
Take 1000 mg acetaminophen (2 Tylenol tablets) and/or 600 mg ibuprofen (3 Advil tablets) every 6 hours as needed    Return if any signs of infection.      Apply ice to reduce the swelling

## 2024-09-22 NOTE — ED PROVIDER NOTES
Patient Seen in: [facility] In [place]      History     Chief Complaint   Patient presents with    Bite     Stated Complaint: dogbite to righ hand/wrist    Subjective:     HPI    69-year-old woman bitten by her own dog today over the right hand and wrist.  She has a hematoma and there is multiple small, half centimeter, lacerations.  This happened earlier today. Her  cleansed the wounds them with peroxide at home.  Last tetanus immunization was 1 year ago.      Objective:   Past Medical History:    Allergic rhinitis    Anxiety    Anxiety state    metoprolol,5:30    Arrhythmia    HAD TACHYCARDIA DUE TO ANXIETY ATTACKS, THIS IS REASON FOR METOPROLOL    Arthritis    Asthma (HCC)    ALLERGY INDUCED ASTHMA,ALSO PANICK ATTACTS    Calculus of kidney    Coronary atherosclerosis    negative XT '14    Depression    Diabetes (HCC)    Esophageal reflux    indigestion    High blood pressure    High cholesterol    Hyperlipidemia    IBS (irritable bowel syndrome)    Nasal congestion    STATES DUE TO ENVIRONMENTAL ALLERGIES    Osteoarthritis    Sleep apnea    cpap    Visual impairment    GLASSES AND CONTACTS              Past Surgical History:   Procedure Laterality Date    Appendectomy      Benign biopsy right      Colonoscopy      Colonoscopy      Knee replacement surgery Right 09/10/2019    PARTIAL           Tonsillectomy      Total knee replacement Left 2017    Total knee replacement Right 09/10/2019                Social History     Socioeconomic History    Marital status:    Tobacco Use    Smoking status: Every Day     Types: Cigarettes     Passive exposure: Yes    Smokeless tobacco: Never    Tobacco comments:     Less than 10 per day stupidly started at 18   Vaping Use    Vaping status: Never Used   Substance and Sexual Activity    Alcohol use: No    Drug use: Never    Sexual activity: Not Currently     Partners: Male   Other Topics Concern    Caffeine Concern Yes    Exercise No     Seat Belt Yes    Special Diet No    Stress Concern Yes    Weight Concern No     Social Determinants of Health     Financial Resource Strain: Low Risk  (1/26/2024)    [facility]    Overall Financial Resource Strain (CARDIA)     Difficulty of Paying Living Expenses: Not very hard   Food Insecurity: No Food Insecurity (1/26/2024)    [facility]    Hunger Vital Sign     Worried About Running Out of Food in the Last Year: Never true     Ran Out of Food in the Last Year: Never true   Transportation Needs: No Transportation Needs (1/26/2024)    [facility]    PRAPARE - Transportation     Lack of Transportation (Medical): No     Lack of Transportation (Non-Medical): No   Housing Stability: Unknown (1/26/2024)    [facility]    Housing Stability Vital Sign     Unable to Pay for Housing in the Last Year: No     Unstable Housing in the Last Year: No              Review of Systems    Positive for stated complaint: dogbite to righ hand/wrist  Other systems are as noted in HPI.  Constitutional and vital signs reviewed.      All other systems reviewed and negative except as noted above.    Physical Exam     ED Triage Vitals [09/22/24 1602]   /76   Pulse 83   Resp 18   Temp 97.4 °F (36.3 °C)   Temp src Temporal   SpO2 96 %   O2 Device None (Room air)       Current:/76   Pulse 83   Temp 97.4 °F (36.3 °C) (Temporal)   Resp 18   Ht 157.5 cm (5' 2\")   Wt 70.3 kg   LMP  (LMP Unknown)   SpO2 96%   BMI 28.35 kg/m²       General:  Vitals as listed.  No acute distress   Extremities: no edema, normal peripheral pulses.  Multiple small lacerations about the left dorsal hand and ventral wrist with deangelo ecchymosis.  Normal perfusion and sensation of the  right hand.              ED COURSE and MDM       I reviewed prior external notes including immunization history    A sepsis performed    The small wounds where there was some skin separation were reapproximated with Steri-Strips    Patient prescribed Augmentin to prevent infection.  She is aware that infection can still occur and she will return if there is any concerns    I have discussed with the patient the results of testing, differential diagnosis, and treatment plan. They expressed clear understanding of these instructions and agrees to the plan provided.    Disposition and Plan     Clinical Impression:  1. Dog bite, hand, right, initial encounter    2. Laceration of right hand without foreign body, initial encounter         Disposition:  Discharge  9/22/2024  5:04 pm    Follow-up:  Wilmer Salinas,   [address] [id number] [phone]    Follow up in 3 day(s)  For wound re-check, As needed        Medications Prescribed:  Current Discharge Medication List        START taking these medications    Details   amoxicillin clavulanate 875-125 MG Oral Tab Take 1 tablet by mouth 2 (two) times daily for 5 days.  Qty: 10 tablet, Refills: 0

## 2024-09-22 NOTE — ED INITIAL ASSESSMENT (HPI)
States her dog bit her on her right hand and wrist a few ago. Small wounds  noted with no active bleeding

## 2024-12-16 ENCOUNTER — OFFICE VISIT (OUTPATIENT)
Dept: FAMILY MEDICINE CLINIC | Facility: CLINIC | Age: 69
End: 2024-12-16
Payer: MEDICARE

## 2024-12-16 VITALS
SYSTOLIC BLOOD PRESSURE: 132 MMHG | OXYGEN SATURATION: 95 % | HEART RATE: 79 BPM | DIASTOLIC BLOOD PRESSURE: 80 MMHG | TEMPERATURE: 98 F | RESPIRATION RATE: 18 BRPM

## 2024-12-16 DIAGNOSIS — R39.9 UTI SYMPTOMS: Primary | ICD-10-CM

## 2024-12-16 LAB
APPEARANCE: CLEAR
BILIRUBIN: NEGATIVE
GLUCOSE (URINE DIPSTICK): 100 MG/DL
KETONES (URINE DIPSTICK): NEGATIVE MG/DL
MULTISTIX EXPIRATION DATE: ABNORMAL DATE
MULTISTIX LOT#: ABNORMAL NUMERIC
NITRITE, URINE: NEGATIVE
PH, URINE: 6 (ref 4.5–8)
PROTEIN (URINE DIPSTICK): NEGATIVE MG/DL
SPECIFIC GRAVITY: 1.01 (ref 1–1.03)
URINE-COLOR: YELLOW
UROBILINOGEN,SEMI-QN: 0.2 MG/DL (ref 0–1.9)

## 2024-12-16 PROCEDURE — 87086 URINE CULTURE/COLONY COUNT: CPT | Performed by: NURSE PRACTITIONER

## 2024-12-16 PROCEDURE — 87088 URINE BACTERIA CULTURE: CPT | Performed by: NURSE PRACTITIONER

## 2024-12-16 PROCEDURE — 87186 SC STD MICRODIL/AGAR DIL: CPT | Performed by: NURSE PRACTITIONER

## 2024-12-16 NOTE — PROGRESS NOTES
CHIEF COMPLAINT:     Chief Complaint   Patient presents with    UTI     Pain while urinating and blood in urine on Saturday, but has the pain or seen blood since staturday       HPI:   Jewels Reynolds is a 69 year old female who presents with symptoms of UTI. Complaining of urinary urgency, blood in urine and bladder pressure on Saturday.  Reports then drank 5 glasses of water and symptoms improved.  Reports unsure if she passed a kidney stone or if infection.  Reports has hx of both in past.  Symptoms have been improved since onset.  Treatments tried: none.  Associated symptoms: none.   Denies any flank pain, fever, further hematuria, nausea, or vomiting.  Denies vaginal complaints. Reports voiding well with strong stream.     Current Outpatient Medications   Medication Sig Dispense Refill    LORazepam 1 MG Oral Tab Take 1 tablet (1 mg total) by mouth 2 (two) times daily.      sertraline 100 MG Oral Tab Take 2 tablets (200 mg total) by mouth daily.      cyanocobalamin 1000 MCG Oral Tab Take 1 tablet (1,000 mcg total) by mouth daily.      fluticasone-salmeterol 250-50 MCG/ACT Inhalation Aerosol Powder, Breath Activated Inhale 1 puff into the lungs Q12H.      Clindamycin Phosphate 1 % External Gel Apply 1 Application topically 2 (two) times daily. 30 g 0    JARDIANCE 10 MG Oral Tab Take 1 tablet (10 mg total) by mouth daily.      Multiple Vitamins-Minerals (WOMENS DAILY FORM/FA/CA/FE) Oral Tab Take by mouth.      pyridoxine 100 MG Oral Tab Take 1 tablet (100 mg total) by mouth daily.      B Complex-Folic Acid (SUPER B COMPLEX MAXI OR) Take by mouth.      Omega-3 Fatty Acids (FISH OIL TRIPLE STRENGTH OR) Take by mouth.      acetaminophen 500 MG Oral Tab Take 2 tablets (1,000 mg total) by mouth every 6 (six) hours as needed for Pain.      atorvastatin 20 MG Oral Tab Take 1 tablet (20 mg total) by mouth daily.      Cholecalciferol (VITAMIN D) 1000 UNITS Oral Tab Take 5,000 mg by mouth daily.        Albuterol Sulfate  HFA (PROAIR HFA) 108 (90 BASE) MCG/ACT Inhalation Aero Soln Inhale 2 puffs into the lungs daily as needed for Wheezing.      Fluticasone Propionate  MCG/ACT Inhalation Aerosol Inhale 1 puff into the lungs 2 (two) times daily.      fenofibrate micronized 134 MG Oral Cap Take 155 mg by mouth nightly.        MetFORMIN HCl 500 MG Oral Tab Take 1 tablet (500 mg total) by mouth 2 (two) times daily with meals.      Metoprolol Succinate ER 50 MG Oral Tablet 24 Hr Take 1.5 tablets (75 mg total) by mouth daily.        Past Medical History:    Allergic rhinitis    Anxiety    Anxiety state    metoprolol,5:30    Arrhythmia    HAD TACHYCARDIA DUE TO ANXIETY ATTACKS, THIS IS REASON FOR METOPROLOL    Arthritis    Asthma (HCC)    ALLERGY INDUCED ASTHMA,ALSO PANICK ATTACTS    Calculus of kidney    Coronary atherosclerosis    negative XT '14    Depression    Diabetes (HCC)    Esophageal reflux    indigestion    High blood pressure    High cholesterol    Hyperlipidemia    IBS (irritable bowel syndrome)    Nasal congestion    STATES DUE TO ENVIRONMENTAL ALLERGIES    Osteoarthritis    Sleep apnea    cpap    Visual impairment    GLASSES AND CONTACTS      Social History:  Social History     Socioeconomic History    Marital status:    Tobacco Use    Smoking status: Every Day     Types: Cigarettes     Passive exposure: Yes    Smokeless tobacco: Never    Tobacco comments:     Less than 10 per day stupidly started at 18   Vaping Use    Vaping status: Never Used   Substance and Sexual Activity    Alcohol use: No    Drug use: Never    Sexual activity: Not Currently     Partners: Male   Other Topics Concern    Caffeine Concern Yes    Exercise No    Seat Belt Yes    Special Diet No    Stress Concern Yes    Weight Concern No     Social Drivers of Health     Financial Resource Strain: Low Risk  (1/26/2024)    [facility]    Overall Financial Resource Strain ([facility])      Difficulty of Paying Living Expenses: Not very hard   Food Insecurity: No Food Insecurity (1/26/2024)    [facility]    Hunger Vital Sign     Worried About Running Out of Food in the Last Year: Never true     Ran Out of Food in the Last Year: Never true   Transportation Needs: No Transportation Needs (1/26/2024)    [facility]    PRAPARE - Transportation     Lack of Transportation (Medical): No     Lack of Transportation (Non-Medical): No   Housing Stability: Unknown (1/26/2024)    [facility]    Housing Stability Vital Sign     Unable to Pay for Housing in the Last Year: No     Unstable Housing in the Last Year: No         REVIEW OF SYSTEMS:   GENERAL: Denies fever, chills, or body aches  SKIN: no rashes, no skin wounds or ulcers.  EYES:denies blurred vision or double vision  HEENT: no congestion, rhinorrhea, sore throat or ear pain  CARDIOVASCULAR: denies chest pain or palpitations  LUNGS: denies shortness of breath, cough, or wheezing  GI: See HPI. No N/V/C/D.   : See HPI.  NEURO: no headaches.    EXAM:   /80 (BP Location: Right arm, Patient Position: Sitting)   Pulse 79   Temp 98.4 °F (36.9 °C) (Temporal)   Resp 18   LMP  (LMP Unknown)   SpO2 95%   GENERAL: well developed, well nourished,in no apparent distress  CARDIO: RRR, no murmurs  LUNGS: clear to ausculation bilaterally, no wheezing or rhonchi  GI: BS present x 4.  No hepatosplenomegaly.  : NO suprapubic tenderness, bladder distention, or CVAT     Recent Results (from the past 24 hours)   URINALYSIS, AUTO, W/O SCOPE    Collection Time: 12/16/24 11:49 AM   Result Value Ref Range    Glucose Urine 100 (A) Negative mg/dL    Bilirubin Urine Negative Negative    Ketones, UA Negative Negative - Trace mg/dL    Spec Gravity 1.015 1.005 - 1.030     Blood Urine Moderate (A) Negative    PH Urine 6.0 5.0 - 8.0    Protein Urine Negative Negative - Trace mg/dL    Urobilinogen Urine 0.2 0.2 - 1.0 mg/dL    Nitrite Urine Negative Negative    Leukocyte Esterase Urine Small (A) Negative    APPEARANCE clear Clear    Color Urine yellow Yellow    Multistix Lot# [id number] Numeric    Multistix Expiration Date [id number] Date         ASSESSMENT AND PLAN:   Jewels Reynolds is a 69 year old female presents with UTI symptoms.    ASSESSMENT:  Encounter Diagnosis   Name Primary?    UTI symptoms Yes       PLAN: D/w patient cannot r/o kidney stone from [facility], but patient is symptom free currently. Will send urine culture, and treat if any bacterial infection. Recommend follow up with PCP for re-check is urine culture negative. Meds as listed below.  Comfort measures as described in Patient Instructions. Patient/Parent has given us consent to send out a culture and understand that they will be contacted in 2-3 days with culture results. If any severe back pain, inability to urinate, fever >101 or persistent vomiting seek emergent care.         Meds & Refills for this Visit:  Requested Prescriptions      No prescriptions requested or ordered in this encounter       Risk and benefits of medication discussed. Stressed importance of completing full course of antibiotic.     There are no Patient Instructions on file for this visit.      The patient indicates understanding of these issues and agrees to the plan.  The patient is asked to return in 3 days if not better. Call if fever, vomiting, worsening symptoms.  Go to ED if back/flank pain with fever and vomiting.

## 2024-12-18 ENCOUNTER — TELEPHONE (OUTPATIENT)
Dept: FAMILY MEDICINE CLINIC | Facility: CLINIC | Age: 69
End: 2024-12-18

## 2024-12-18 DIAGNOSIS — R39.9 UTI SYMPTOMS: Primary | ICD-10-CM

## 2024-12-18 RX ORDER — CIPROFLOXACIN 250 MG/1
250 TABLET, FILM COATED ORAL 2 TIMES DAILY
Qty: 10 TABLET | Refills: 0 | Status: SHIPPED | OUTPATIENT
Start: 2024-12-18 | End: 2024-12-23

## 2024-12-18 NOTE — TELEPHONE ENCOUNTER
Virtual Telephone Check-In    Pt. Returned call. Discussed + urine culture results.  Pt. Reports she is not having any current symptoms, due to growth will treat with abx.  Pt. Reports she cannot tollerate bactrim due to stomach upset.  Pt. Is Type 2 DM, no BMP in last year to check kidney function. Will send over Cipro for UTI. Pt. Verbalized understanding.         THIAGO Peña

## 2025-01-05 ENCOUNTER — HOSPITAL ENCOUNTER (OUTPATIENT)
Facility: HOSPITAL | Age: 70
Setting detail: OBSERVATION
LOS: 1 days | Discharge: HOME | End: 2025-01-06
Attending: EMERGENCY MEDICINE | Admitting: HOSPITALIST
Payer: MEDICARE

## 2025-01-05 ENCOUNTER — APPOINTMENT (OUTPATIENT)
Dept: GENERAL RADIOLOGY | Age: 70
End: 2025-01-05
Attending: EMERGENCY MEDICINE
Payer: MEDICARE

## 2025-01-05 DIAGNOSIS — R09.02 HYPOXIA: ICD-10-CM

## 2025-01-05 DIAGNOSIS — J98.01 ACUTE BRONCHOSPASM: Primary | ICD-10-CM

## 2025-01-05 LAB
ALBUMIN SERPL-MCNC: 5.1 G/DL (ref 3.2–4.8)
ALBUMIN/GLOB SERPL: 1.5 {RATIO} (ref 1–2)
ALP LIVER SERPL-CCNC: 75 U/L
ALT SERPL-CCNC: 18 U/L
ANION GAP SERPL CALC-SCNC: 6 MMOL/L (ref 0–18)
AST SERPL-CCNC: 21 U/L (ref ?–34)
BASOPHILS # BLD AUTO: 0.09 X10(3) UL (ref 0–0.2)
BASOPHILS NFR BLD AUTO: 0.7 %
BILIRUB SERPL-MCNC: 0.4 MG/DL (ref 0.2–1.1)
BUN BLD-MCNC: 15 MG/DL (ref 9–23)
CALCIUM BLD-MCNC: 11.3 MG/DL (ref 8.7–10.4)
CHLORIDE SERPL-SCNC: 108 MMOL/L (ref 98–112)
CO2 SERPL-SCNC: 27 MMOL/L (ref 21–32)
CREAT BLD-MCNC: 0.85 MG/DL
EGFRCR SERPLBLD CKD-EPI 2021: 75 ML/MIN/1.73M2 (ref 60–?)
EOSINOPHIL # BLD AUTO: 0.45 X10(3) UL (ref 0–0.7)
EOSINOPHIL NFR BLD AUTO: 3.3 %
ERYTHROCYTE [DISTWIDTH] IN BLOOD BY AUTOMATED COUNT: 13.2 %
GLOBULIN PLAS-MCNC: 3.4 G/DL (ref 2–3.5)
GLUCOSE BLD-MCNC: 111 MG/DL (ref 70–99)
GLUCOSE BLD-MCNC: 179 MG/DL (ref 70–99)
HCT VFR BLD AUTO: 48 %
HGB BLD-MCNC: 15.8 G/DL
IMM GRANULOCYTES # BLD AUTO: 0.11 X10(3) UL (ref 0–1)
IMM GRANULOCYTES NFR BLD: 0.8 %
LYMPHOCYTES # BLD AUTO: 2.21 X10(3) UL (ref 1–4)
LYMPHOCYTES NFR BLD AUTO: 16 %
MCH RBC QN AUTO: 30.3 PG (ref 26–34)
MCHC RBC AUTO-ENTMCNC: 32.9 G/DL (ref 31–37)
MCV RBC AUTO: 92 FL
MONOCYTES # BLD AUTO: 0.89 X10(3) UL (ref 0.1–1)
MONOCYTES NFR BLD AUTO: 6.4 %
NEUTROPHILS # BLD AUTO: 10.09 X10 (3) UL (ref 1.5–7.7)
NEUTROPHILS # BLD AUTO: 10.09 X10(3) UL (ref 1.5–7.7)
NEUTROPHILS NFR BLD AUTO: 72.8 %
NT-PROBNP SERPL-MCNC: 185 PG/ML (ref ?–125)
OSMOLALITY SERPL CALC.SUM OF ELEC: 294 MOSM/KG (ref 275–295)
PLATELET # BLD AUTO: 290 10(3)UL (ref 150–450)
POCT INFLUENZA A: NEGATIVE
POCT INFLUENZA B: NEGATIVE
POTASSIUM SERPL-SCNC: 4.2 MMOL/L (ref 3.5–5.1)
PROT SERPL-MCNC: 8.5 G/DL (ref 5.7–8.2)
RBC # BLD AUTO: 5.22 X10(6)UL
SARS-COV-2 RNA RESP QL NAA+PROBE: NOT DETECTED
SODIUM SERPL-SCNC: 141 MMOL/L (ref 136–145)
TROPONIN I SERPL HS-MCNC: <3 NG/L
WBC # BLD AUTO: 13.8 X10(3) UL (ref 4–11)

## 2025-01-05 PROCEDURE — 99222 1ST HOSP IP/OBS MODERATE 55: CPT | Performed by: STUDENT IN AN ORGANIZED HEALTH CARE EDUCATION/TRAINING PROGRAM

## 2025-01-05 PROCEDURE — 71045 X-RAY EXAM CHEST 1 VIEW: CPT | Performed by: EMERGENCY MEDICINE

## 2025-01-05 RX ORDER — ALBUTEROL SULFATE 5 MG/ML
10 SOLUTION RESPIRATORY (INHALATION) ONCE
Status: DISCONTINUED | OUTPATIENT
Start: 2025-01-05 | End: 2025-01-05

## 2025-01-05 RX ORDER — METHYLPREDNISOLONE SODIUM SUCCINATE 125 MG/2ML
125 INJECTION INTRAMUSCULAR; INTRAVENOUS ONCE
Status: COMPLETED | OUTPATIENT
Start: 2025-01-05 | End: 2025-01-05

## 2025-01-05 RX ORDER — ALBUTEROL SULFATE 5 MG/ML
10 SOLUTION RESPIRATORY (INHALATION) ONCE
Status: COMPLETED | OUTPATIENT
Start: 2025-01-05 | End: 2025-01-05

## 2025-01-06 VITALS
OXYGEN SATURATION: 90 % | BODY MASS INDEX: 29.08 KG/M2 | TEMPERATURE: 99 F | RESPIRATION RATE: 20 BRPM | SYSTOLIC BLOOD PRESSURE: 136 MMHG | DIASTOLIC BLOOD PRESSURE: 67 MMHG | HEIGHT: 62 IN | HEART RATE: 105 BPM | WEIGHT: 158 LBS

## 2025-01-06 PROBLEM — J44.9 COPD (CHRONIC OBSTRUCTIVE PULMONARY DISEASE) (HCC): Status: ACTIVE | Noted: 2025-01-06

## 2025-01-06 LAB
ANION GAP SERPL CALC-SCNC: 7 MMOL/L (ref 0–18)
BUN BLD-MCNC: 14 MG/DL (ref 9–23)
CALCIUM BLD-MCNC: 10.3 MG/DL (ref 8.7–10.4)
CHLORIDE SERPL-SCNC: 108 MMOL/L (ref 98–112)
CO2 SERPL-SCNC: 23 MMOL/L (ref 21–32)
CREAT BLD-MCNC: 0.79 MG/DL
EGFRCR SERPLBLD CKD-EPI 2021: 82 ML/MIN/1.73M2 (ref 60–?)
GLUCOSE BLD-MCNC: 149 MG/DL (ref 70–99)
GLUCOSE BLD-MCNC: 171 MG/DL (ref 70–99)
OSMOLALITY SERPL CALC.SUM OF ELEC: 289 MOSM/KG (ref 275–295)
POTASSIUM SERPL-SCNC: 4.2 MMOL/L (ref 3.5–5.1)
PTH-INTACT SERPL-MCNC: 28 PG/ML (ref 18.5–88)
SODIUM SERPL-SCNC: 138 MMOL/L (ref 136–145)

## 2025-01-06 PROCEDURE — 99239 HOSP IP/OBS DSCHRG MGMT >30: CPT | Performed by: HOSPITALIST

## 2025-01-06 RX ORDER — CODEINE PHOSPHATE AND GUAIFENESIN 10; 100 MG/5ML; MG/5ML
5 SOLUTION ORAL 3 TIMES DAILY PRN
Qty: 75 ML | Refills: 0 | Status: SHIPPED | OUTPATIENT
Start: 2025-01-06

## 2025-01-06 RX ORDER — ATORVASTATIN CALCIUM 20 MG/1
20 TABLET, FILM COATED ORAL DAILY
Status: DISCONTINUED | OUTPATIENT
Start: 2025-01-06 | End: 2025-01-06

## 2025-01-06 RX ORDER — ECHINACEA PURPUREA EXTRACT 125 MG
1 TABLET ORAL
Status: DISCONTINUED | OUTPATIENT
Start: 2025-01-06 | End: 2025-01-06

## 2025-01-06 RX ORDER — FLUTICASONE PROPIONATE AND SALMETEROL 250; 50 UG/1; UG/1
1 POWDER RESPIRATORY (INHALATION) EVERY 12 HOURS
Status: DISCONTINUED | OUTPATIENT
Start: 2025-01-06 | End: 2025-01-06

## 2025-01-06 RX ORDER — FLUTICASONE PROPIONATE AND SALMETEROL 250; 50 UG/1; UG/1
1 POWDER RESPIRATORY (INHALATION)
Status: DISCONTINUED | OUTPATIENT
Start: 2025-01-06 | End: 2025-01-06

## 2025-01-06 RX ORDER — ALBUTEROL SULFATE 0.83 MG/ML
2.5 SOLUTION RESPIRATORY (INHALATION) EVERY 2 HOUR PRN
Status: DISCONTINUED | OUTPATIENT
Start: 2025-01-06 | End: 2025-01-06

## 2025-01-06 RX ORDER — LORAZEPAM 1 MG/1
1 TABLET ORAL 2 TIMES DAILY
Status: DISCONTINUED | OUTPATIENT
Start: 2025-01-06 | End: 2025-01-06

## 2025-01-06 RX ORDER — METHYLPREDNISOLONE SODIUM SUCCINATE 125 MG/2ML
60 INJECTION INTRAMUSCULAR; INTRAVENOUS EVERY 8 HOURS
Status: DISCONTINUED | OUTPATIENT
Start: 2025-01-06 | End: 2025-01-06

## 2025-01-06 RX ORDER — ACETAMINOPHEN 500 MG
1000 TABLET ORAL EVERY 8 HOURS PRN
Status: DISCONTINUED | OUTPATIENT
Start: 2025-01-06 | End: 2025-01-06

## 2025-01-06 RX ORDER — IPRATROPIUM BROMIDE AND ALBUTEROL SULFATE 2.5; .5 MG/3ML; MG/3ML
3 SOLUTION RESPIRATORY (INHALATION)
Status: DISCONTINUED | OUTPATIENT
Start: 2025-01-06 | End: 2025-01-06

## 2025-01-06 RX ORDER — BENZONATATE 200 MG/1
200 CAPSULE ORAL 3 TIMES DAILY PRN
Status: DISCONTINUED | OUTPATIENT
Start: 2025-01-06 | End: 2025-01-06

## 2025-01-06 RX ORDER — SERTRALINE HYDROCHLORIDE 100 MG/1
200 TABLET, FILM COATED ORAL DAILY
Status: DISCONTINUED | OUTPATIENT
Start: 2025-01-06 | End: 2025-01-06

## 2025-01-06 RX ORDER — ENOXAPARIN SODIUM 100 MG/ML
40 INJECTION SUBCUTANEOUS DAILY
Status: DISCONTINUED | OUTPATIENT
Start: 2025-01-06 | End: 2025-01-06

## 2025-01-06 RX ORDER — IPRATROPIUM BROMIDE AND ALBUTEROL SULFATE 2.5; .5 MG/3ML; MG/3ML
3 SOLUTION RESPIRATORY (INHALATION) EVERY 6 HOURS
Status: DISCONTINUED | OUTPATIENT
Start: 2025-01-06 | End: 2025-01-06

## 2025-01-06 RX ORDER — METHYLPREDNISOLONE 4 MG/1
TABLET ORAL
Qty: 1 EACH | Refills: 0 | Status: SHIPPED | OUTPATIENT
Start: 2025-01-06

## 2025-01-06 NOTE — PLAN OF CARE
NURSING DISCHARGE NOTE    Discharged Home via Ambulatory.  Accompanied by Spouse  Belongings Taken by patient/family.    Patient discharged in stable condition. IV and tele removed. AVS reviewed with patient and family. Bedside belongings taken.

## 2025-01-06 NOTE — ED QUICK NOTES
Orders for admission, patient is aware of plan and ready to go upstairs. Any questions, please call ED RN Goyo  at extension [phone].     Patient Covid vaccination status: Fully vaccinated     COVID Test Ordered in ED: Rapid SARS-CoV-2 by PCR    COVID Suspicion at Admission: N/A    Running Infusions:  None    Mental Status/LOC at time of transport: Ax O 3    Other pertinent information:   CIWA score: N/A   NIH score:  N/A

## 2025-01-06 NOTE — ED PROVIDER NOTES
Patient Seen in: [facility] In [place]      History     Chief Complaint   Patient presents with    Cough/URI    Difficulty Breathing     Stated Complaint: cough, congestion,  shortness of breath    Subjective:   HPI      Patient has a long history of smoking.  She presents to the ED for evaluation for 2-day history of cough shortness of breath wheezing congestion low-grade fever at home.  Having increased work of breathing today with significant shortness of breath with activity.  No chest pain no hemoptysis.  No pain or swelling in the legs.  No.  Other associated symptoms or complaints.    Objective:     Past Medical History:    Allergic rhinitis    Anxiety    Anxiety state    metoprolol,5:30    Arrhythmia    HAD TACHYCARDIA DUE TO ANXIETY ATTACKS, THIS IS REASON FOR METOPROLOL    Arthritis    Asthma (HCC)    ALLERGY INDUCED ASTHMA,ALSO PANICK ATTACTS    Calculus of kidney    Coronary atherosclerosis    negative XT '14    Depression    Diabetes (HCC)    Esophageal reflux    indigestion    High blood pressure    High cholesterol    Hyperlipidemia    IBS (irritable bowel syndrome)    Nasal congestion    STATES DUE TO ENVIRONMENTAL ALLERGIES    Osteoarthritis    Sleep apnea    cpap    Visual impairment    GLASSES AND CONTACTS              Past Surgical History:   Procedure Laterality Date    Appendectomy      Benign biopsy right      Colonoscopy      Colonoscopy      Knee replacement surgery Right 09/10/2019    PARTIAL           Tonsillectomy      Total knee replacement Left 2017    Total knee replacement Right 09/10/2019                Social History     Socioeconomic History    Marital status:    Tobacco Use    Smoking status: Every Day     Types: Cigarettes     Passive exposure: Yes    Smokeless tobacco: Never    Tobacco comments:     Less than 10 per day stupidly started at 18   Vaping Use    Vaping status: Never Used   Substance and Sexual Activity    Alcohol use: No    Drug use:  Never    Sexual activity: Not Currently     Partners: Male   Other Topics Concern    Caffeine Concern Yes    Exercise No    Seat Belt Yes    Special Diet No    Stress Concern Yes    Weight Concern No     Social Drivers of Health     Financial Resource Strain: Low Risk  (1/26/2024)    [facility]    Overall Financial Resource Strain (CARDIA)     Difficulty of Paying Living Expenses: Not very hard   Food Insecurity: No Food Insecurity (1/26/2024)    [facility]    Hunger Vital Sign     Worried About Running Out of Food in the Last Year: Never true     Ran Out of Food in the Last Year: Never true   Transportation Needs: No Transportation Needs (1/26/2024)    [facility]    PRAPARE - Transportation     Lack of Transportation (Medical): No     Lack of Transportation (Non-Medical): No   Housing Stability: Unknown (1/26/2024)    [facility]    Housing Stability Vital Sign     Unable to Pay for Housing in the Last Year: No     Unstable Housing in the Last Year: No                  Physical Exam     ED Triage Vitals [01/05/25 1810]   BP (!) 162/119   Pulse 88   Resp 26   Temp 99.5 °F (37.5 °C)   Temp src Oral   SpO2 (!) 84 %   O2 Device None (Room air)       Current Vitals:   Vital Signs  BP: (!) 145/95  Pulse: 88  Resp: 18  Temp: 99.5 °F (37.5 °C)  Temp src: Oral    Oxygen Therapy  SpO2: 97 %  O2 Device: Nasal cannula  O2 Flow Rate (L/min): 2 L/min        Physical Exam  Vitals and nursing note reviewed.   Constitutional:       General: She is not in acute distress.     Appearance: She is well-developed. She is not toxic-appearing.   HENT:      Head: Normocephalic and atraumatic.   Eyes:      General: No scleral icterus.     Conjunctiva/sclera:  Conjunctivae normal.   Cardiovascular:      Rate and Rhythm: Normal rate.   Pulmonary:      Effort: Pulmonary effort is normal. No respiratory distress.      Breath sounds: Decreased breath sounds and wheezing present.   Abdominal:      General: There is no distension.   Musculoskeletal:         General: No tenderness. Normal range of motion.      Cervical back: Normal range of motion and neck supple.   Skin:     General: Skin is warm and dry.      Findings: No rash.   Neurological:      Mental Status: She is alert and oriented to person, place, and time.      Motor: No abnormal muscle tone.      Coordination: Coordination normal.   Psychiatric:         Behavior: Behavior normal.            ED Course     Labs Reviewed   CBC WITH DIFFERENTIAL WITH PLATELET - Abnormal; Notable for the following components:       Result Value    WBC 13.8 (*)     Neutrophil Absolute Prelim 10.09 (*)     Neutrophil Absolute 10.09 (*)     All other components within normal limits   COMP METABOLIC PANEL (14) - Abnormal; Notable for the following components:    Glucose 111 (*)     Calcium, Total 11.3 (*)     Total Protein 8.5 (*)     Albumin 5.1 (*)     All other components within normal limits   PRO BETA NATRIURETIC PEPTIDE - Abnormal; Notable for the following components:    Pro-Beta Natriuretic Peptide 185 (*)     All other components within normal limits   TROPONIN I HIGH SENSITIVITY - Normal   RAPID SARS-COV-2 BY PCR - Normal   POCT FLU TEST - Normal    Narrative:     This assay is a rapid molecular in vitro test utilizing nucleic acid amplification of influenza A and B viral RNA.   RAINBOW DRAW BLUE     EKG    Rate, intervals and axes as noted on EKG Report.  Rate: 87  Rhythm: Sinus Rhythm  Reading: Sinus rhythm.  No ischemic changes                         MDM              -History source other than patient -         -Comorbidities did add complexity to the management are mentioned in the HPI above        -I personally  reviewed the prior external notes and the medical record to obtain additional history and ED visit September 2024 reviewed.  She was seen for a dog bite to the right and        -DDX: Includes but not limited to -pneumonia, hypoxemic respiratory failure which is a medical condition that can pose a threat to life/function        -I personally reviewed the radiographs findings and they show- NAD   Please refer to radiology report for official interpretation            Labs Reviewed   CBC WITH DIFFERENTIAL WITH PLATELET - Abnormal; Notable for the following components:       Result Value    WBC 13.8 (*)     Neutrophil Absolute Prelim 10.09 (*)     Neutrophil Absolute 10.09 (*)     All other components within normal limits   COMP METABOLIC PANEL (14) - Abnormal; Notable for the following components:    Glucose 111 (*)     Calcium, Total 11.3 (*)     Total Protein 8.5 (*)     Albumin 5.1 (*)     All other components within normal limits   PRO BETA NATRIURETIC PEPTIDE - Abnormal; Notable for the following components:    Pro-Beta Natriuretic Peptide 185 (*)     All other components within normal limits   TROPONIN I HIGH SENSITIVITY - Normal   RAPID SARS-COV-2 BY PCR - Normal   POCT FLU TEST - Normal    Narrative:     This assay is a rapid molecular in vitro test utilizing nucleic acid amplification of influenza A and B viral RNA.   RAINBOW DRAW BLUE     Medications   ipratropium (Atrovent) 0.02 % nebulizer solution 1 mg (1 mg Nebulization Given 1/5/25 1820)   albuterol (Ventolin) (5 MG/ML) 0.5% nebulizer solution 10 mg (10 mg Nebulization Given 1/5/25 1819)   methylPREDNISolone sodium succinate (Solu-MEDROL) injection 125 mg (125 mg Intravenous Given 1/5/25 1814)     On reexamination she has better air exchange but still hypoxic on room air 88% at rest.  She will be admitted to the hospital service for care discussed with hospitalist service for admission    Admission disposition: 1/5/2025  8:15 PM           Medical  Decision Making      Disposition and Plan     Clinical Impression:  1. Acute bronchospasm    2. Hypoxia         Disposition:  Admit  1/5/2025  8:15 pm    Follow-up:  No follow-up provider specified.        Medications Prescribed:  Current Discharge Medication List              Supplementary Documentation:         Hospital Problems       Present on Admission  Date Reviewed: 12/16/2024            ICD-10-CM Noted POA    * (Principal) Acute bronchospasm J98.01 1/5/2025 Unknown

## 2025-01-06 NOTE — PLAN OF CARE
Problem: PAIN - ADULT  Goal: Verbalizes/displays adequate comfort level or patient's stated pain goal  Description: INTERVENTIONS:  - Encourage pt to monitor pain and request assistance  - Assess pain using appropriate pain scale  - Administer analgesics based on type and severity of pain and evaluate response  - Implement non-pharmacological measures as appropriate and evaluate response  - Consider cultural and social influences on pain and pain management  - Manage/alleviate anxiety  - Utilize distraction and/or relaxation techniques  - Monitor for opioid side effects  - Notify MD/LIP if interventions unsuccessful or patient reports new pain  - Anticipate increased pain with activity and pre-medicate as appropriate  1/6/2025 1034 by Layne Lyman, RN  Outcome: Completed  1/6/2025 0927 by Layne Lyman, RN  Outcome: Progressing     Problem: RISK FOR INFECTION - ADULT  Goal: Absence of fever/infection during anticipated neutropenic period  Description: INTERVENTIONS  - Monitor WBC  - Administer growth factors as ordered  - Implement neutropenic guidelines  1/6/2025 1034 by Layne Lyman, RN  Outcome: Completed  1/6/2025 0927 by Layne Lyman, RN  Outcome: Progressing     Problem: SAFETY ADULT - FALL  Goal: Free from fall injury  Description: INTERVENTIONS:  - Assess pt frequently for physical needs  - Identify cognitive and physical deficits and behaviors that affect risk of falls.  - [place] fall precautions as indicated by assessment.  - Educate pt/family on patient safety including physical limitations  - Instruct pt to call for assistance with activity based on assessment  - Modify environment to reduce risk of injury  - Provide assistive devices as appropriate  - Consider OT/PT consult to assist with strengthening/mobility  - Encourage toileting schedule  1/6/2025 1034 by Layne Lyman, RN  Outcome: Completed  1/6/2025 0927 by Layne Lyman, RN  Outcome: Progressing     Problem: DISCHARGE PLANNING  Goal: Discharge to  home or other facility with appropriate resources  Description: INTERVENTIONS:  - Identify barriers to discharge w/pt and caregiver  - Include patient/family/discharge partner in discharge planning  - Arrange for needed discharge resources and transportation as appropriate  - Identify discharge learning needs (meds, wound care, etc)  - Arrange for interpreters to assist at discharge as needed  - Consider post-discharge preferences of patient/family/discharge partner  - Complete POLST form as appropriate  - Assess patient's ability to be responsible for managing their own health  - Refer to Case Management Department for coordinating discharge planning if the patient needs post-hospital services based on physician/LIP order or complex needs related to functional status, cognitive ability or social support system  1/6/2025 1034 by Layne Lyman, RN  Outcome: Completed  1/6/2025 0927 by Layne Lyman, RN  Outcome: Progressing

## 2025-01-06 NOTE — RESPIRATORY THERAPY NOTE
Patient to be placed on CPAP   Patient refused Yes  CPAP Settings Auto Pap 5 min cm H2O. 20 max cm H2O. Mask Option:  Full face  Interface Full face  Comments: Currently patient refused CPAP, LARUA history , no machine in room.

## 2025-01-06 NOTE — PLAN OF CARE
Problem: PAIN - ADULT  Goal: Verbalizes/displays adequate comfort level or patient's stated pain goal  Description: INTERVENTIONS:  - Encourage pt to monitor pain and request assistance  - Assess pain using appropriate pain scale  - Administer analgesics based on type and severity of pain and evaluate response  - Implement non-pharmacological measures as appropriate and evaluate response  - Consider cultural and social influences on pain and pain management  - Manage/alleviate anxiety  - Utilize distraction and/or relaxation techniques  - Monitor for opioid side effects  - Notify MD/LIP if interventions unsuccessful or patient reports new pain  - Anticipate increased pain with activity and pre-medicate as appropriate  Outcome: Progressing     Problem: RISK FOR INFECTION - ADULT  Goal: Absence of fever/infection during anticipated neutropenic period  Description: INTERVENTIONS  - Monitor WBC  - Administer growth factors as ordered  - Implement neutropenic guidelines  Outcome: Progressing     Problem: SAFETY ADULT - FALL  Goal: Free from fall injury  Description: INTERVENTIONS:  - Assess pt frequently for physical needs  - Identify cognitive and physical deficits and behaviors that affect risk of falls.  - [place] fall precautions as indicated by assessment.  - Educate pt/family on patient safety including physical limitations  - Instruct pt to call for assistance with activity based on assessment  - Modify environment to reduce risk of injury  - Provide assistive devices as appropriate  - Consider OT/PT consult to assist with strengthening/mobility  - Encourage toileting schedule  Outcome: Progressing     Problem: DISCHARGE PLANNING  Goal: Discharge to home or other facility with appropriate resources  Description: INTERVENTIONS:  - Identify barriers to discharge w/pt and caregiver  - Include patient/family/discharge partner in discharge planning  - Arrange for needed discharge resources and transportation as  appropriate  - Identify discharge learning needs (meds, wound care, etc)  - Arrange for interpreters to assist at discharge as needed  - Consider post-discharge preferences of patient/family/discharge partner  - Complete POLST form as appropriate  - Assess patient's ability to be responsible for managing their own health  - Refer to Case Management Department for coordinating discharge planning if the patient needs post-hospital services based on physician/LIP order or complex needs related to functional status, cognitive ability or social support system  Outcome: Progressing

## 2025-01-06 NOTE — H&P
[facility]     Jewels Reynolds Patient Status:  Inpatient    1955 MRN [id number]   Location [facility] 5NW-A Attending Carlotta Tatum MD   Hosp Day # 1 PCP Rik Lima MD     Chief Complaint: Wheezing     Subjective:    History of Present Illness:     Jewels Reynolds is a 69 year old female with  h/o anxiety, HTN, HLD, DM type 2. The patient reports one day of shortness of breath no relief with inhalers, dry cough. She had sore throat, runny nose for a day but that resolved.  Given hour long neb in  ED- feels better and wants to go home tomorrow morning.     History/Other:    Past Medical History:  Past Medical History:    Allergic rhinitis    Anxiety    Anxiety state    metoprolol,5:30    Arrhythmia    HAD TACHYCARDIA DUE TO ANXIETY ATTACKS, THIS IS REASON FOR METOPROLOL    Arthritis    Asthma (HCC)    ALLERGY INDUCED ASTHMA,ALSO PANICK ATTACTS    Calculus of kidney    Coronary atherosclerosis    negative XT '14    Depression    Diabetes (HCC)    Esophageal reflux    indigestion    High blood pressure    High cholesterol    Hyperlipidemia    IBS (irritable bowel syndrome)    Nasal congestion    STATES DUE TO ENVIRONMENTAL ALLERGIES    Osteoarthritis    Sleep apnea    cpap    Visual impairment    GLASSES AND CONTACTS     Past Surgical History:   Past Surgical History:   Procedure Laterality Date    Appendectomy      Benign biopsy right      Colonoscopy      Colonoscopy      Knee replacement surgery Right 09/10/2019    PARTIAL           Tonsillectomy      Total knee replacement Left 2017    Total knee replacement Right 09/10/2019      Family History:   Family History   Problem Relation Age of Onset    Ovarian Cancer Mother 80        estimate    Cancer Mother         OVARIAN    Heart Disorder Mother         Murmur    Other (Other) Father         DEMENTIA    Dementia Father     Depression Father     Heart Disorder Father         Stents    Psychiatric Father          Paranoid    Stroke Father         TIA’S    Other (Other) Sister         RHEUMATOID     Social History:    reports that she has been smoking cigarettes. She has been exposed to tobacco smoke. She has never used smokeless tobacco. She reports that she does not drink alcohol and does not use drugs.     Allergies: Allergies[1]    Medications:  Medications Ordered Prior to Encounter[2]    Review of Systems:   A comprehensive review of systems was completed.    Pertinent positives and negatives noted in the HPI.    Objective:   Physical Exam:    /62 (BP Location: Left arm)   Pulse 104   Temp 99.3 °F (37.4 °C) (Oral)   Resp 18   Ht 5' 2\" (1.575 m)   Wt 158 lb (71.7 kg)   LMP  (LMP Unknown)   SpO2 94%   BMI 28.90 kg/m²   General: No acute distress, Alert  Respiratory: No rhonchi, no wheezes  Cardiovascular: S1, S2. Regular rate and rhythm  Abdomen: Soft, Non-tender, non-distended, positive bowel sounds  Neuro: No new focal deficits  Extremities: No edema      Results:    Labs:      Labs Last 24 Hours:    Recent Labs   Lab 01/05/25  1812   RBC 5.22   HGB 15.8   HCT 48.0   MCV 92.0   MCH 30.3   MCHC 32.9   RDW 13.2   NEPRELIM 10.09*   WBC 13.8*   .0       Recent Labs   Lab 01/05/25  1812   *   BUN 15   CREATSERUM 0.85   EGFRCR 75   CA 11.3*   ALB 5.1*      K 4.2      CO2 27.0   ALKPHO 75   AST 21   ALT 18   BILT 0.4   TP 8.5*       Estimated Glomerular Filtration Rate: 75.2 mL/min/1.73m2 (by CKD-EPI based on SCr of 0.85 mg/dL).    Lab Results   Component Value Date    INR 1.09 08/30/2019    INR 1.0 10/10/2016       Recent Labs   Lab 01/05/25  1812   TROPHS <3       Recent Labs   Lab 01/05/25  1812   PBNP 185*       No results for input(s): \"PCT\" in the last 168 hours.    Imaging: Imaging data reviewed in Epic.    Assessment & Plan:      #Acute exacerbation of COPD  Steroids, NEbs  Pt quit smoking 3 mo ago- smoked ~1 PPD> 30 years   #Anxiety   Continue home meds         Plan of care  discussed with pt, RN    Anastacia Cavazos MD    Supplementary Documentation:     The 21st Century Cures Act makes medical notes like these available to patients in the interest of transparency. Please be advised this is a medical document. Medical documents are intended to carry relevant information, facts as evident, and the clinical opinion of the practitioner. The medical note is intended as peer to peer communication and may appear blunt or direct. It is written in medical language and may contain abbreviations or verbiage that are unfamiliar.               **Certification      PHYSICIAN Certification of Need for Inpatient Hospitalization - Initial Certification    Patient will require inpatient services that will reasonably be expected to span two midnight's based on the clinical documentation in H+P.   Based on patients current state of illness, I anticipate that, after discharge, patient will require TBD.                        [1]   Allergies  Allergen Reactions    Clarithromycin DIARRHEA and NAUSEA ONLY     Per pt \" diarrhea and abdominal pain.\"    Dust Mite Extract Runny nose    Seasonal      NASAL CONGESTION    Vantin [Cefpodoxime] RASH     Reaction many years ago   [2]   No current facility-administered medications on file prior to encounter.     Current Outpatient Medications on File Prior to Encounter   Medication Sig Dispense Refill    LORazepam 1 MG Oral Tab Take 1 tablet (1 mg total) by mouth 2 (two) times daily.      sertraline 100 MG Oral Tab Take 2 tablets (200 mg total) by mouth daily.      cyanocobalamin 1000 MCG Oral Tab Take 1 tablet (1,000 mcg total) by mouth daily.      fluticasone-salmeterol 250-50 MCG/ACT Inhalation Aerosol Powder, Breath Activated Inhale 1 puff into the lungs Q12H.      JARDIANCE 10 MG Oral Tab Take 1 tablet (10 mg total) by mouth daily.      Multiple Vitamins-Minerals (WOMENS DAILY FORM/FA/CA/FE) Oral Tab Take by mouth.      B Complex-Folic Acid (SUPER B COMPLEX MAXI OR)  Take by mouth.      Omega-3 Fatty Acids (FISH OIL TRIPLE STRENGTH OR) Take by mouth.      acetaminophen 500 MG Oral Tab Take 2 tablets (1,000 mg total) by mouth every 6 (six) hours as needed for Pain.      atorvastatin 20 MG Oral Tab Take 1 tablet (20 mg total) by mouth daily.      Cholecalciferol (VITAMIN D) 1000 UNITS Oral Tab Take 5,000 mg by mouth daily.        Albuterol Sulfate HFA (PROAIR HFA) 108 (90 BASE) MCG/ACT Inhalation Aero Soln Inhale 2 puffs into the lungs daily as needed for Wheezing.      Fluticasone Propionate  MCG/ACT Inhalation Aerosol Inhale 1 puff into the lungs 2 (two) times daily.      fenofibrate micronized 134 MG Oral Cap Take 155 mg by mouth nightly.        MetFORMIN HCl 500 MG Oral Tab Take 1 tablet (500 mg total) by mouth 2 (two) times daily with meals.      Metoprolol Succinate ER 50 MG Oral Tablet 24 Hr Take 1.5 tablets (75 mg total) by mouth daily.      [] ciprofloxacin 250 MG Oral Tab Take 1 tablet (250 mg total) by mouth 2 (two) times daily for 5 days. 10 tablet 0    Clindamycin Phosphate 1 % External Gel Apply 1 Application topically 2 (two) times daily. 30 g 0    pyridoxine 100 MG Oral Tab Take 1 tablet (100 mg total) by mouth daily. (Patient not taking: Reported on 2025)

## 2025-01-06 NOTE — PROGRESS NOTES
NURSING ADMISSION NOTE      Patient admitted via Cart  Oriented to room.  Safety precautions initiated.  Bed in low position.  Call light in reach.    Patient received from  ED at 2219. Patient room  air sating above 90%, Has dry cough, non-productive. Voids. Up ad jose miguel. Reports no pain or discomfort.  at bedside. Navigator completed. No further needs at this time.

## 2025-01-06 NOTE — CM/SW NOTE
This is a Code 44. Patient failed inpatient criteria. Second level of review completed and supports observation.  UR committee in agreement.  Dr. Corona placed OBS order. AVERY given to the patient and signed copy placed in their chart.

## 2025-01-06 NOTE — DIETARY NOTE
[facility]   part of [facility]   CLINICAL NUTRITION    Jewels Reynolds     Admitting diagnosis:  Acute bronchospasm [J98.01]  Hypoxia [R09.02]    Ht: 157.5 cm (5' 2\")  Wt: 71.7 kg (158 lb).   Body mass index is 28.9 kg/m².  IBW: 50 kg    Wt Readings from Last 6 Encounters:   01/05/25 71.7 kg (158 lb)   09/22/24 70.3 kg (155 lb)   01/19/24 70.9 kg (156 lb 6 oz)   11/13/23 71.7 kg (158 lb)   09/10/19 79.1 kg (174 lb 6.4 oz)   10/11/16 80.3 kg (177 lb)        Labs/Meds reviewed    Diet:       Procedures    Regular/General diet Is Patient on Accuchecks? No     Percent Meals Eaten (last 3 days)       None              Pt chart reviewed d/t screening at risk - MST 3. 70 y/o female with PMH T2DM, HTN, HLD presents with SOB, sore throat, cough. Per cahart review, pt's UBW ~155 lbs, stable over past year. Pt visited, reports recent intentional weight loss d/t eating healthier diet. Pt reports  recently had a heart attack and they have both been eating healthier. Pt on regular diet, eating 3 meals per day at home. Answered all questions at this time. Will follow per protocol.    Patient reports good appetite at this time.  Nursing notes reports   intake for last meal.  Tolerating po diet without diarrhea, emesis, or constipation.   No significant weight changes noted.     Patient is at low nutrition risk at this time.    Please consult if patient status changes or nutrition issues arise.      Zeinab Sanders, MS, RDN, LDN  Clinical Dietitian

## 2025-01-06 NOTE — PROGRESS NOTES
[facility]   part of [facility]     Hospitalist Progress Note     Jewels Reynolds Patient Status:  Inpatient    1955 MRN [id number]   Location [facility] 5NW-A Attending Alcon Corona MD   Hosp Day # 1 PCP Rik Lima MD     Subjective:   Feels much better   No fevers/less wheezing     Objective:    Review of Systems:   A comprehensive review of systems was completed; pertinent positive and negatives stated in subjective.  Vital signs:  Temp:  [98 °F (36.7 °C)-99.5 °F (37.5 °C)] 98 °F (36.7 °C)  Pulse:  [] 104  Resp:  [16-26] 16  BP: (131-162)/() 131/70  SpO2:  [84 %-99 %] 94 %  Physical Exam:    General: No acute distress    Respiratory: b/l wheezes R>L   Cardiovascular: S1, S2, RRR  Abdomen: Soft, NT/ND, +BS  Extremities: no edema    Diagnostic Data:    Labs:  Recent Labs   Lab 25  1812   WBC 13.8*   HGB 15.8   MCV 92.0   .0     Recent Labs   Lab 25  1812   *   BUN 15   CREATSERUM 0.85   CA 11.3*   ALB 5.1*      K 4.2      CO2 27.0   ALKPHO 75   AST 21   ALT 18   BILT 0.4   TP 8.5*     Estimated Creatinine Clearance: 50.8 mL/min (based on SCr of 0.85 mg/dL).  No results for input(s): \"PTP\", \"INR\" in the last 168 hours.     Microbiology  No results found for this visit on 25.  Imaging: Reviewed in Epic.  Medications:    LORazepam  1 mg Oral BID    enoxaparin  40 mg Subcutaneous Daily    atorvastatin  20 mg Oral Daily    metoprolol succinate ER  75 mg Oral Daily Beta Blocker    sertraline  200 mg Oral Daily    methylPREDNISolone  60 mg Intravenous Q8H    ipratropium-albuterol  3 mL Nebulization 6 times per day    fluticasone-salmeterol  1 puff Inhalation Q12H       Assessment & Plan:    #Acute exacerbation of COPD  Steroids, NEbs  Pt quit smoking 3 mo ago- smoked ~1 PPD> 30 years     #Anxiety   Continue home meds     #Hypercalcemia- check PTH - WNL     Ok for DC          Supplementary Documentation:   Quality:  DVT Mechanical  Prophylaxis:   SCDs,    DVT Pharmacologic Prophylaxis   Medication    enoxaparin (Lovenox) 40 MG/0.4ML SUBQ injection 40 mg                Code Status: Full Code  [place]: No urinary catheter in place  [place] Duration (in days):   Central line:    TREY:   At this point Ms. Reynolds is expected to be discharge to: home    The 21st Century Cures Act makes medical notes like these available to patients in the interest of transparency. Please be advised this is a medical document. Medical documents are intended to carry relevant information, facts as evident, and the clinical opinion of the practitioner. The medical note is intended as peer to peer communication and may appear blunt or direct. It is written in medical language and may contain abbreviations or verbiage that are unfamiliar.

## 2025-01-07 LAB
ATRIAL RATE: 87 BPM
P AXIS: 71 DEGREES
P-R INTERVAL: 168 MS
Q-T INTERVAL: 354 MS
QRS DURATION: 86 MS
QTC CALCULATION (BEZET): 425 MS
R AXIS: -43 DEGREES
T AXIS: 68 DEGREES
VENTRICULAR RATE: 87 BPM

## 2025-01-07 NOTE — DISCHARGE SUMMARY
[facility]     Jewels Reynolds Patient Status:  Observation    1955 MRN [id number]   Location [facility] 5NW-A Attending No att. providers found   Hosp Day # 1 PCP Rik Lima MD     Date of Admission: 2025  Date of Discharge: 2025    Discharge Disposition: Home or Self Care    Admitting Diagnosis:   Acute bronchospasm [J98.01]  Hypoxia [R09.02]    Hospital Discharge Diagnoses:  #Acute exacerbation of COPD  Steroids, NEbs  Pt quit smoking 3 mo ago- smoked ~1 PPD> 30 years      #Anxiety   Continue home meds      #Hypercalcemia- repeat wnl, PTH wnl     Lace+ Score: 65  59-90 High Risk  29-58 Medium Risk  0-28   Low Risk.     Brief Synopsis: Patient improved faster than expected.  Patient was placed on steroid taper and inhalers and was on room air prior to discharge.  Patient discharged in stable condition    Procedures during hospitalization:   None    Lab/Test results pending at Discharge:   None    Consultants:  None    Discharge Medication List:     Discharge Medications        START taking these medications        Instructions Prescription details   guaiFENesin-codeine 100-10 MG/5ML Soln  Commonly known as: Robitussin AC      Take 5 mL by mouth 3 (three) times daily as needed for cough.   Quantity: 75 mL  Refills: 0     methylPREDNISolone 4 MG Tbpk  Commonly known as: Medrol      As directed.   Quantity: 1 each  Refills: 0            CONTINUE taking these medications        Instructions Prescription details   acetaminophen 500 MG Tabs  Commonly known as: Tylenol Extra Strength      Take 2 tablets (1,000 mg total) by mouth every 6 (six) hours as needed for Pain.   Refills: 0     atorvastatin 20 MG Tabs  Commonly known as: Lipitor      Take 1 tablet (20 mg total) by mouth daily.   Refills: 0     cyanocobalamin 1000 MCG Tabs  Commonly known as: Vitamin B12      Take 1 tablet (1,000 mcg total) by mouth daily.   Refills: 0     fenofibrate micronized 134 MG  Caps  Commonly known as: Lofibra      Take 155 mg by mouth nightly.   Refills: 0     FISH OIL TRIPLE STRENGTH OR      Take by mouth.   Refills: 0     fluticasone propionate 110 MCG/ACT Aero  Commonly known as: Flovent HFA      Inhale 1 puff into the lungs 2 (two) times daily.   Refills: 0     fluticasone-salmeterol 250-50 MCG/ACT Aepb  Commonly known as: Advair Diskus      Inhale 1 puff into the lungs Q12H.   Refills: 0     Jardiance 10 MG Tabs  Generic drug: empagliflozin      Take 1 tablet (10 mg total) by mouth daily.   Refills: 0     LORazepam 1 MG Tabs  Commonly known as: Ativan      Take 1 tablet (1 mg total) by mouth 2 (two) times daily.   Refills: 0     metFORMIN 500 MG Tabs  Commonly known as: Glucophage      Take 1 tablet (500 mg total) by mouth 2 (two) times daily with meals.   Refills: 0     metoprolol succinate ER 50 MG Tb24  Commonly known as: Toprol XL      Take 1.5 tablets (75 mg total) by mouth daily.   Refills: 0     ProAir  (90 Base) MCG/ACT Aers  Generic drug: albuterol      Inhale 2 puffs into the lungs daily as needed for Wheezing.   Refills: 0     sertraline 100 MG Tabs  Commonly known as: Zoloft      Take 2 tablets (200 mg total) by mouth daily.   Refills: 0     SUPER B COMPLEX MAXI OR      Take by mouth.   Refills: 0     Womens Daily Form/FA/Ca/Fe Tabs      Take by mouth.   Refills: 0            STOP taking these medications      ciprofloxacin 250 MG Tabs  Commonly known as: Cipro        Clindamycin Phosphate 1 % Gel        pyridoxine 100 MG Tabs  Commonly known as: Vitamin B6        Vitamin D 1000 units Tabs                  Where to Get Your Medications        These medications were sent to [facility]/pharmacy #[id number] - [place], [place] - [address] [phone], [phone] [address] [facility] [address]      Phone: [phone]   guaiFENesin-codeine 100-10 MG/5ML Soln  methylPREDNISolone 4 MG Tbpk         Follow-up appointment:   Rik Lima MD  [address] [place] [address]  [facility] [id number] [phone]    Follow up in 1 week(s)        -----------------------------------------------------------------------------------------------  PATIENT DISCHARGE INSTRUCTIONS: See electronic chart    Alcon Corona MD 1/7/2025    Time spent: 33 minutes

## 2025-05-22 ENCOUNTER — APPOINTMENT (OUTPATIENT)
Dept: URBAN - METROPOLITAN AREA CLINIC 247 | Age: 70
Setting detail: DERMATOLOGY
End: 2025-05-22

## 2025-05-22 DIAGNOSIS — L73.8 OTHER SPECIFIED FOLLICULAR DISORDERS: ICD-10-CM

## 2025-05-22 DIAGNOSIS — L72.0 EPIDERMAL CYST: ICD-10-CM

## 2025-05-22 DIAGNOSIS — L72.8 OTHER FOLLICULAR CYSTS OF THE SKIN AND SUBCUTANEOUS TISSUE: ICD-10-CM

## 2025-05-22 PROCEDURE — 99202 OFFICE O/P NEW SF 15 MIN: CPT

## 2025-05-22 PROCEDURE — OTHER MIPS QUALITY: OTHER

## 2025-05-22 PROCEDURE — OTHER COUNSELING: OTHER

## 2025-05-22 PROCEDURE — OTHER DEFER: OTHER

## 2025-05-22 PROCEDURE — OTHER ADDITIONAL NOTES: OTHER

## 2025-05-22 ASSESSMENT — LOCATION SIMPLE DESCRIPTION DERM
LOCATION SIMPLE: LEFT CHEEK
LOCATION SIMPLE: RIGHT THIGH
LOCATION SIMPLE: RIGHT CHEEK

## 2025-05-22 ASSESSMENT — LOCATION ZONE DERM
LOCATION ZONE: FACE
LOCATION ZONE: LEG

## 2025-05-22 ASSESSMENT — LOCATION DETAILED DESCRIPTION DERM
LOCATION DETAILED: LEFT SUPERIOR CENTRAL MALAR CHEEK
LOCATION DETAILED: RIGHT ANTERIOR PROXIMAL THIGH
LOCATION DETAILED: RIGHT SUPERIOR CENTRAL MALAR CHEEK

## (undated) DEVICE — CONTAINER SPEC STR 4OZ GRY LID

## (undated) DEVICE — DRAPE SHEET LG

## (undated) DEVICE — Device: Brand: STABLECUT®

## (undated) DEVICE — BANDAGE FLXMSTR 11YDX6IN STRL

## (undated) DEVICE — 60 ML SYRINGE LUER-LOCK TIP: Brand: MONOJECT

## (undated) DEVICE — GAUZE SPONGES,12 PLY: Brand: CURITY

## (undated) DEVICE — POLAR CARE CUBE COOLING UNIT

## (undated) DEVICE — SUTURE VICRYL 2-0 FS-1

## (undated) DEVICE — GAMMEX® PI HYBRID SIZE 9, STERILE POWDER-FREE SURGICAL GLOVE, POLYISOPRENE AND NEOPRENE BLEND: Brand: GAMMEX

## (undated) DEVICE — 3M™ STERI-DRAPE™ U-DRAPE 1015: Brand: STERI-DRAPE™

## (undated) DEVICE — BLADE SAW SAGITTAL 19.5

## (undated) DEVICE — GAMMEX® NON-LATEX PI ORTHO SIZE 9, STERILE POLYISOPRENE POWDER-FREE SURGICAL GLOVE: Brand: GAMMEX

## (undated) DEVICE — DERMABOND LIQUID ADHESIVE

## (undated) DEVICE — 2T11 #2 PDO 36 X 36: Brand: 2T11 #2 PDO 36 X 36

## (undated) DEVICE — CHLORAPREP 26ML APPLICATOR

## (undated) DEVICE — COTTON ROLL: Brand: DEROYAL

## (undated) DEVICE — GAMMEX® NON-LATEX PI ORTHO SIZE 8.5, STERILE POLYISOPRENE POWDER-FREE SURGICAL GLOVE: Brand: GAMMEX

## (undated) DEVICE — VIOLET BRAIDED (POLYGLACTIN 910), SYNTHETIC ABSORBABLE SUTURE: Brand: COATED VICRYL

## (undated) DEVICE — NEEDLE SPINAL 20X3-1/2 YELLOW

## (undated) DEVICE — 2DE14 2-0 PDO 24 X 24: Brand: 2DE14 2-0 PDO 24 X 24

## (undated) DEVICE — BATTERY

## (undated) DEVICE — CEMENT MIXING SYSTEM WITH FEMORAL BREAKWAY NOZZLE: Brand: REVOLUTION

## (undated) DEVICE — BANDAGE ROLL,100% COTTON, 6 PLY, LARGE: Brand: KERLIX

## (undated) DEVICE — SOL  .9 3000ML

## (undated) DEVICE — DRESSING 10X4IN ANMC SAFETAC

## (undated) DEVICE — PAD THRP 16IN WRPON MU LNG STM

## (undated) DEVICE — T5 HOOD WITH PEEL AWAY FACE SHIELD

## (undated) DEVICE — DUAL CUT SAGITTAL BLADE

## (undated) DEVICE — TOTAL KNEE: Brand: MEDLINE INDUSTRIES, INC.

## (undated) DEVICE — DRESSING BORDER AQUACEL 4X10

## (undated) NOTE — LETTER
Notifier: [facility]       Patient Name: Jewels Reynolds       Identification Number: [id number]                          Advance Beneficiary Notice of Noncoverage (ABN)   NOTE:  If Medicare doesn’t pay for D. Items/service(s) below, you may have to pay.  Medicare does not pay for everything, even some care that you or your health care provider have good reason to think you need. We expect Medicare may not pay for the D. items/service(s) below.  Items or Services Reason Medicare May Not Pay: Estimated Cost   __EKG ($129.00)  _x_Pap smear ($48.23) _x_Pelvic/Breast ($65.00)  __ Ear Irrigation ($149)  __ Injection(s)  ___ Tdap ($70)       ___ Meningitis ($206)   __Prevnar ($285)  ___ Td ($51)            ___Shingrix ($215)        __Prevnar 20 ($309)  ___ Hep A ($156)   ___Prolia ($1827.00)     __ Xiaflex ($              )   ___ Hep B ($167)      __Pneumovax ($155)                                            ___ Vaccine Administration ($31)   __ Medicare does not cover this service      _x_ Medicare may not pay for this   item/service for your condition     x__ Medicare may not pay for this item/service as often as this     $ 113.23   WHAT YOU NEED TO DO NOW:  Read this notice, so you can make an informed decision about your care.  Ask us any questions that you may have after you finish reading.  Choose an option below about whether to receive the D. item/service(s)  listed above.  Note: If you choose Option 1 or 2, we may help you to use any other insurance that you might have, but Medicare cannot require us to do this.  OPTIONS: Check only one box.  We cannot choose a box for you.   OPTION 1. I want the D. item/service(s) listed above. You may ask to be paid now, but I also want Medicare billed for an official decision on payment, which is sent to me on a Medicare Summary Notice (MSN). I understand that if Medicare doesn’t pay, I am responsible for payment, but I can appeal to Medicare by following the  directions on the MSN. If Medicare does pay, you will refund any payments I made to you, less co-pays or deductibles.  OPTION 2. I want the D. item/service(s) listed above, but do not bill Medicare. You may ask to be paid now as I am responsible for payment. I cannot appeal if Medicare is not billed.  OPTION 3. I don't want the D. item/service(s) listed above. I understand with this choice I am not responsible for payment, and I cannot appeal to see if Medicare would pay.    H. Additional Information:    This notice gives our opinion, not an official Medicare decision. If you have other questions on this notice or Medicare billing, call 1-800-MEDICARE (1-339.370.1218/TTY: [phone]). Signing below means that you have received and understand this notice. You also receive a copy.  Signature: Date:       You have the right to get Medicare information in an accessible format, like large print, Braille, or audio. You also have the right to file a complaint if you feel you’ve been discriminated against. Visit Medicare.gov/about- us/hcgndnrqztgtr-apbgyqjdfnwfqdwez-qiqlgo.  According to the Paperwork Reduction Act of 1995, no persons are required to respond to a collection of information unless it displays a valid OMB control number. The valid OMB control number for this information collection is 9443-7810. The time required to complete this information collection is estimated to average 7 minutes per response, including the time to review instructions, search existing data resources, gather the data needed, and complete and review the information collection. If you have comments concerning the accuracy of the time estimate or suggestions for improving this form, please write to: CMS, [facility] [employer]     [place], Attn: [employer] Clearance Officer, [place], [place] [id number].  Form CMS-R-131 (Exp. 1/31/2026) Form Approved OMB No. [id number]